# Patient Record
Sex: MALE | Race: WHITE | NOT HISPANIC OR LATINO | Employment: OTHER | URBAN - NONMETROPOLITAN AREA
[De-identification: names, ages, dates, MRNs, and addresses within clinical notes are randomized per-mention and may not be internally consistent; named-entity substitution may affect disease eponyms.]

---

## 2017-01-05 ENCOUNTER — OFFICE VISIT (OUTPATIENT)
Dept: MEDICAL GROUP | Facility: PHYSICIAN GROUP | Age: 62
End: 2017-01-05
Payer: COMMERCIAL

## 2017-01-05 VITALS
RESPIRATION RATE: 18 BRPM | WEIGHT: 250 LBS | SYSTOLIC BLOOD PRESSURE: 130 MMHG | DIASTOLIC BLOOD PRESSURE: 70 MMHG | TEMPERATURE: 98.5 F | HEIGHT: 71 IN | BODY MASS INDEX: 35 KG/M2 | HEART RATE: 98 BPM | OXYGEN SATURATION: 93 %

## 2017-01-05 DIAGNOSIS — Z12.5 PROSTATE CANCER SCREENING: ICD-10-CM

## 2017-01-05 DIAGNOSIS — M51.26 RUPTURED LUMBAR DISC: ICD-10-CM

## 2017-01-05 DIAGNOSIS — E66.09 OBESITY DUE TO EXCESS CALORIES, UNSPECIFIED OBESITY SEVERITY: ICD-10-CM

## 2017-01-05 DIAGNOSIS — M10.9 GOUT, UNSPECIFIED CAUSE, UNSPECIFIED CHRONICITY, UNSPECIFIED SITE: ICD-10-CM

## 2017-01-05 DIAGNOSIS — F32.A DEPRESSION, UNSPECIFIED DEPRESSION TYPE: ICD-10-CM

## 2017-01-05 DIAGNOSIS — E55.9 VITAMIN D DEFICIENCY: ICD-10-CM

## 2017-01-05 DIAGNOSIS — I10 ESSENTIAL HYPERTENSION: ICD-10-CM

## 2017-01-05 PROCEDURE — 99214 OFFICE O/P EST MOD 30 MIN: CPT | Performed by: INTERNAL MEDICINE

## 2017-01-05 RX ORDER — CYCLOBENZAPRINE HCL 10 MG
10 TABLET ORAL NIGHTLY PRN
Qty: 30 TAB | Refills: 3 | Status: SHIPPED | OUTPATIENT
Start: 2017-01-05 | End: 2017-12-28 | Stop reason: SDUPTHER

## 2017-01-05 ASSESSMENT — PATIENT HEALTH QUESTIONNAIRE - PHQ9: CLINICAL INTERPRETATION OF PHQ2 SCORE: 0

## 2017-01-05 NOTE — ASSESSMENT & PLAN NOTE
Variable, not using the flexeril currently , on the diclofenac with fair results.  Has not had injections for over 3 years, history of facet injections in the past as well.

## 2017-01-05 NOTE — MR AVS SNAPSHOT
"        Hayden Cooper   2017 9:00 AM   Office Visit   MRN: [id number]    Department:  [facility]   Dept Phone:  [phone]    Description:  Male : 1955   Provider:  Laura DOMINIQUE M.D.           Reason for Visit     Medication Refill Lisinopril       Allergies as of 2017     No Known Allergies      You were diagnosed with     Essential hypertension   [1739317]       Ruptured lumbar disc   [917998]       Obesity due to excess calories, unspecified obesity severity   [6880696]       Depression, unspecified depression type   [9753844]       Vitamin D deficiency   [7173241]       Prostate cancer screening   [082480]       Gout, unspecified cause, unspecified chronicity, unspecified site   [7551167]         Vital Signs     Blood Pressure Pulse Temperature Respirations Height Weight    130/70 mmHg 98 36.9 °C (98.5 °F) 18 1.816 m (5' 11.5\") 113.399 kg (250 lb)    Body Mass Index Oxygen Saturation Smoking Status             34.39 kg/m2 93% Former Smoker         Basic Information     Date Of Birth Sex Race Ethnicity Preferred Language    1955 Male White Non- English      Your appointments     Mar 14, 2017  8:15 AM   Adult Draw/Collection with [facility]   [facility] (--)    [address]  [place] [address]   [phone]            Mar 21, 2017  9:20 AM   Established Patient with Laura DOMINIQUE M.D.   [facility] (--)    [address]  [place] [id number] [phone]           You will be receiving a confirmation call a few days before your appointment from our automated call confirmation system.              Problem List              ICD-10-CM Priority Class Noted - Resolved    Ruptured lumbar disc M51.26   2011 - Present    H/O: gout Z87.39   2011 - Present    Depression F32.9   2011 - Present    HTN (hypertension) I10   2012 - Present    Mild vitamin D deficiency E55.9   2012 - Present    Obesity E66.9   " 4/19/2012 - Present    Dyslipidemia E78.5   6/14/2012 - Present    Hyperkeratosis L85.9   10/1/2015 - Present    History of colonic polyps Z86.010   2/3/2016 - Present    Primary insomnia F51.01   9/21/2016 - Present    Peyronie disease N48.6   9/21/2016 - Present      Health Maintenance        Date Due Completion Dates    IMM ZOSTER VACCINE 10/20/2013 ---    IMM INFLUENZA (1) 9/1/2016 ---    COLONOSCOPY 4/14/2022 4/14/2012 (N/S)    Override on 4/14/2012: (N/S)    IMM DTaP/Tdap/Td Vaccine (2 - Td) 6/14/2022 6/14/2012            Current Immunizations     Tdap Vaccine 6/14/2012      Below and/or attached are the medications your provider expects you to take. Review all of your home medications and newly ordered medications with your provider and/or pharmacist. Follow medication instructions as directed by your provider and/or pharmacist. Please keep your medication list with you and share with your provider. Update the information when medications are discontinued, doses are changed, or new medications (including over-the-counter products) are added; and carry medication information at all times in the event of emergency situations     Allergies:  No Known Allergies          Medications  Valid as of: January 05, 2017 -  9:56 AM    Generic Name Brand Name Tablet Size Instructions for use    Allopurinol (Tab) ZYLOPRIM 300 MG Take 1 Tab by mouth every day.        Cholecalciferol (Tab) Vitamin D (Cholecalciferol) 1000 UNITS Take  by mouth.        Cyclobenzaprine HCl (Tab) FLEXERIL 10 MG Take 1 Tab by mouth at bedtime as needed.        Diclofenac Sodium (Tablet Delayed Response) VOLTAREN 75 MG Take 1 Tab by mouth 2 times a day.        Gabapentin (Cap) NEURONTIN 300 MG Take 1 Cap by mouth 3 times a day.        Lisinopril (Tab) PRINIVIL 20 MG Take 1 Tab by mouth 2 times a day.        Lovastatin (Tab) MEVACOR 40 MG Take 1 Tab by mouth every day.        Niacin (Antihyperlipidemic) (Tab CR) NIASPAN 500 MG Take 1 Tab by mouth  every day.        Zolpidem Tartrate (Tab) AMBIEN 10 MG Take 1 Tab by mouth at bedtime as needed for Sleep.        .                 Medicines prescribed today were sent to:     [address] - [address] [place] [address] [place] [address]    Phone: [phone] Fax: [phone]    Open 24 Hours?: No      Medication refill instructions:       If your prescription bottle indicates you have medication refills left, it is not necessary to call your provider’s office. Please contact your pharmacy and they will refill your medication.    If your prescription bottle indicates you do not have any refills left, you may request refills at any time through one of the following ways: The online [employer] system (except Urgent Care), by calling your provider’s office, or by asking your pharmacy to contact your provider’s office with a refill request. Medication refills are processed only during regular business hours and may not be available until the next business day. Your provider may request additional information or to have a follow-up visit with you prior to refilling your medication.   *Please Note: Medication refills are assigned a new Rx number when refilled electronically. Your pharmacy may indicate that no refills were authorized even though a new prescription for the same medication is available at the pharmacy. Please request the medicine by name with the pharmacy before contacting your provider for a refill.        Your To Do List     Future Labs/Procedures Complete By Expires    CBC WITH DIFFERENTIAL  As directed 1/5/2018    COMP METABOLIC PANEL  As directed 1/5/2018    HEMOGLOBIN A1C  As directed 1/5/2018    LIPID PROFILE  As directed 1/5/2018    PROSTATE SPECIFIC AG SCREENING  As directed 1/5/2018    VITAMIN D,25 HYDROXY  As directed 1/5/2018      Instructions    Exercise  Cut portions in 1/2, weight loss.               [facility] Status: Patient Declined

## 2017-01-05 NOTE — ASSESSMENT & PLAN NOTE
mac states depression is ok, he is not working, sleeping pretty well, not using the ambien at all, limited walking.  He enjoys hunting but otherwise no specific activity.

## 2017-01-10 NOTE — PROGRESS NOTES
Chief Complaint   Patient presents with   • Medication Refill     Lisinopril        HISTORY OF PRESENT ILLNESS: Patient is a 61 y.o. male established patient who presents today to discuss the medical issues below.    HTN (hypertension)  Following at home generally in the 115-125/70 range.  Weight is up a bit.  He has noted heart rate is at 98, no chest pain or palpitations.      Ruptured lumbar disc  Variable, not using the flexeril currently , on the diclofenac with fair results.  Has not had injections for over 3 years, history of facet injections in the past as well. No Le numbness tingling or weakness    Obesity  Patient has gained a bit of weight. Admits to trouble with diet over the holidays, not exercising regullarly    Depression  patietn states depression is ok, he is not working, sleeping pretty well, not using the ambien at all, limited walking.  He enjoys hunting but otherwise no specific activity.        Patient Active Problem List    Diagnosis Date Noted   • Primary insomnia 09/21/2016   • Peyronie disease 09/21/2016   • History of colonic polyps 02/03/2016   • Hyperkeratosis 10/01/2015   • Dyslipidemia 06/14/2012   • Mild vitamin D deficiency 04/19/2012   • Obesity 04/19/2012   • HTN (hypertension) 04/11/2012   • Ruptured lumbar disc 12/01/2011   • H/O: gout 12/01/2011   • Depression 12/01/2011       Allergies:Review of patient's allergies indicates no known allergies.    Current Outpatient Prescriptions   Medication Sig Dispense Refill   • cyclobenzaprine (FLEXERIL) 10 MG Tab Take 1 Tab by mouth at bedtime as needed. 30 Tab 3   • Vitamin D, Cholecalciferol, 1000 UNITS Tab Take  by mouth.     • lisinopril (PRINIVIL) 20 MG Tab Take 1 Tab by mouth 2 times a day. 180 Tab 3   • lovastatin (MEVACOR) 40 MG tablet Take 1 Tab by mouth every day. 90 Tab 3   • allopurinol (ZYLOPRIM) 300 MG Tab Take 1 Tab by mouth every day. 90 Tab 3   • niacin SR (NIASPAN) 500 MG Tab CR Take 1 Tab by mouth every day. 90 Tab 1  "  • gabapentin (NEURONTIN) 300 MG Cap Take 1 Cap by mouth 3 times a day. 180 Cap 3   • diclofenac EC (VOLTAREN) 75 MG Tablet Delayed Response Take 1 Tab by mouth 2 times a day. 60 Tab 3   • zolpidem (AMBIEN) 10 MG Tab Take 1 Tab by mouth at bedtime as needed for Sleep. (Patient not taking: Reported on 2017) 30 Tab 2     No current facility-administered medications for this visit.         Past Medical History   Diagnosis Date   • Ruptured lumbar disc 2011   • GOUT 2011   • Depression 2011   • Hyperkeratosis 10/1/2015   • Primary insomnia 2016   • Peyronie disease 2016       Social History   Substance Use Topics   • Smoking status: Former Smoker -- 10 years     Types: Cigarettes     Quit date: 04/10/1985   • Smokeless tobacco: Former User     Types: Chew     Quit date: 03/15/2014      Comment: x 30 years - 7.12.12 thinking about it   • Alcohol Use: 0.0 oz/week     0 Standard drinks or equivalent per week      Comment: maybe 6 beers a month       Family Status   Relation Status Death Age   • Mother  72     CHF   • Sister Alive    • Father  86     MI   • Brother Alive    • Maternal Grandmother     • Maternal Grandfather     • Paternal Grandmother     • Paternal Grandfather     • Daughter Alive      Family History   Problem Relation Age of Onset   • Heart Disease Mother    • Diabetes Mother    • Diabetes Sister        ROS:    Respiratory: Negative for cough, sputum production, shortness of breath or wheezing.    Cardiovascular: Negative for chest pain, palpitations, orthopnea, dyspnea with exertion or edema.   Gastrointestinal: Negative for GI upset, nausea, vomiting, abdominal pain, constipation or diarrhea.   Genitourinary: Negative for dysuria, urgency, hesitancy or frequency.       Exam:    Blood pressure 130/70, pulse 98, temperature 36.9 °C (98.5 °F), resp. rate 18, height 1.816 m (5' 11.5\"), weight 113.399 kg (250 lb), SpO2 93 " %.  General:  Well nourished, well developed male in NAD.  HENT: Normocephalic, bilateral TMs are intact, nasal and oral mucosa with no lesions,   Spine: diffuse lumbar tenderness, no localization to vertebral body, neg straight leg  Pulmonary: Clear to ausculation and percussion.  Normal effort. No rales, rhonchi, or wheezing.  Cardiovascular: Regular rate and rhythm without murmur.   Abdomen: Normal bowel sounds soft and nontender no palpable liver spleen bladder mass.  Extremities: No LE edema noted.  Neuro: Grossly nonfocal.  Psych: Alert and oriented to person, place, and time. Appropriate mood and conversation.        This dictation was created using voice recognition software. I have made reasonable attempts to correct errors, however, errors of grammar and content may exist.          Assessment/Plan:    1. Essential hypertension  Well controlled, discussed lab monitoring   - COMP METABOLIC PANEL; Future  - CBC WITH DIFFERENTIAL; Future    2. Ruptured lumbar disc  Stable with good back mechanics, prn flexeril, diclofenac, discussed good back mechanics.   - cyclobenzaprine (FLEXERIL) 10 MG Tab; Take 1 Tab by mouth at bedtime as needed.  Dispense: 30 Tab; Refill: 3    3. Obesity due to excess calories, unspecified obesity severity  Discussed implication of the weight on back pain, reviewed diet exercise and seight loss.   - LIPID PROFILE; Future  - HEMOGLOBIN A1C; Future    4. Depression, unspecified depression type  Patient states he is doing fairly well, discussed regular exercise as helpful support.     5. Vitamin D deficiency  On supplement, continue monitoring  - VITAMIN D,25 HYDROXY; Future    6. Prostate cancer screening  - PROSTATE SPECIFIC AG SCREENING; Future    7. Gout, unspecified cause, unspecified chronicity, unspecified site  No recent outbreaks, on allopurinol, at risk if weight loss discussed, monitor labs.   - URIC ACID, SERUM

## 2017-03-14 ENCOUNTER — HOSPITAL ENCOUNTER (OUTPATIENT)
Dept: LAB | Facility: MEDICAL CENTER | Age: 62
End: 2017-03-14
Attending: INTERNAL MEDICINE
Payer: COMMERCIAL

## 2017-03-14 DIAGNOSIS — Z12.5 PROSTATE CANCER SCREENING: ICD-10-CM

## 2017-03-14 DIAGNOSIS — E66.09 OBESITY DUE TO EXCESS CALORIES, UNSPECIFIED OBESITY SEVERITY: ICD-10-CM

## 2017-03-14 DIAGNOSIS — E55.9 VITAMIN D DEFICIENCY: ICD-10-CM

## 2017-03-14 DIAGNOSIS — I10 ESSENTIAL HYPERTENSION: ICD-10-CM

## 2017-03-14 LAB
25(OH)D3 SERPL-MCNC: 31 NG/ML (ref 30–100)
ALBUMIN SERPL BCP-MCNC: 4.2 G/DL (ref 3.2–4.9)
ALBUMIN/GLOB SERPL: 1.6 G/DL
ALP SERPL-CCNC: 65 U/L (ref 30–99)
ALT SERPL-CCNC: 29 U/L (ref 2–50)
ANION GAP SERPL CALC-SCNC: 6 MMOL/L (ref 0–11.9)
AST SERPL-CCNC: 23 U/L (ref 12–45)
BASOPHILS # BLD AUTO: 0.04 K/UL (ref 0–0.12)
BASOPHILS NFR BLD AUTO: 0.6 % (ref 0–1.8)
BILIRUB SERPL-MCNC: 0.7 MG/DL (ref 0.1–1.5)
BUN SERPL-MCNC: 18 MG/DL (ref 8–22)
CALCIUM SERPL-MCNC: 9.4 MG/DL (ref 8.5–10.5)
CHLORIDE SERPL-SCNC: 104 MMOL/L (ref 96–112)
CHOLEST SERPL-MCNC: 147 MG/DL (ref 100–199)
CO2 SERPL-SCNC: 28 MMOL/L (ref 20–33)
CREAT SERPL-MCNC: 1.14 MG/DL (ref 0.5–1.4)
EOSINOPHIL # BLD: 0.08 K/UL (ref 0–0.51)
EOSINOPHIL NFR BLD AUTO: 1.2 % (ref 0–6.9)
ERYTHROCYTE [DISTWIDTH] IN BLOOD BY AUTOMATED COUNT: 43.6 FL (ref 35.9–50)
EST. AVERAGE GLUCOSE BLD GHB EST-MCNC: 111 MG/DL
GLOBULIN SER CALC-MCNC: 2.6 G/DL (ref 1.9–3.5)
GLUCOSE SERPL-MCNC: 108 MG/DL (ref 65–99)
HBA1C MFR BLD: 5.5 % (ref 0–5.6)
HCT VFR BLD AUTO: 49 % (ref 42–52)
HDLC SERPL-MCNC: 31 MG/DL
HGB BLD-MCNC: 16.9 G/DL (ref 14–18)
IMM GRANULOCYTES # BLD AUTO: 0.05 K/UL (ref 0–0.11)
IMM GRANULOCYTES NFR BLD AUTO: 0.8 % (ref 0–0.9)
LDLC SERPL CALC-MCNC: 61 MG/DL
LYMPHOCYTES # BLD: 2.23 K/UL (ref 1–4.8)
LYMPHOCYTES NFR BLD AUTO: 34.8 % (ref 22–41)
MCH RBC QN AUTO: 33.3 PG (ref 27–33)
MCHC RBC AUTO-ENTMCNC: 34.5 G/DL (ref 33.7–35.3)
MCV RBC AUTO: 96.5 FL (ref 81.4–97.8)
MONOCYTES # BLD: 0.32 K/UL (ref 0–0.85)
MONOCYTES NFR BLD AUTO: 5 % (ref 0–13.4)
NEUTROPHILS # BLD: 3.69 K/UL (ref 1.82–7.42)
NEUTROPHILS NFR BLD AUTO: 57.6 % (ref 44–72)
NRBC # BLD AUTO: 0 K/UL
NRBC BLD-RTO: 0 /100 WBC
PLATELET # BLD AUTO: 190 K/UL (ref 164–446)
PMV BLD AUTO: 10.4 FL (ref 9–12.9)
POTASSIUM SERPL-SCNC: 4.1 MMOL/L (ref 3.6–5.5)
PROT SERPL-MCNC: 6.8 G/DL (ref 6–8.2)
PSA SERPL DL<=0.01 NG/ML-MCNC: 1.35 NG/ML (ref 0–4)
RBC # BLD AUTO: 5.08 M/UL (ref 4.7–6.1)
SODIUM SERPL-SCNC: 138 MMOL/L (ref 135–145)
TRIGL SERPL-MCNC: 277 MG/DL (ref 0–149)
URATE SERPL-MCNC: 5.3 MG/DL (ref 2.5–8.3)
WBC # BLD AUTO: 6.4 K/UL (ref 4.8–10.8)

## 2017-03-14 PROCEDURE — 83036 HEMOGLOBIN GLYCOSYLATED A1C: CPT

## 2017-03-14 PROCEDURE — 84153 ASSAY OF PSA TOTAL: CPT

## 2017-03-14 PROCEDURE — 82306 VITAMIN D 25 HYDROXY: CPT

## 2017-03-14 PROCEDURE — 80053 COMPREHEN METABOLIC PANEL: CPT

## 2017-03-14 PROCEDURE — 84550 ASSAY OF BLOOD/URIC ACID: CPT

## 2017-03-14 PROCEDURE — 85025 COMPLETE CBC W/AUTO DIFF WBC: CPT

## 2017-03-14 PROCEDURE — 36415 COLL VENOUS BLD VENIPUNCTURE: CPT

## 2017-03-14 PROCEDURE — 80061 LIPID PANEL: CPT

## 2017-03-23 ENCOUNTER — OFFICE VISIT (OUTPATIENT)
Dept: MEDICAL GROUP | Facility: PHYSICIAN GROUP | Age: 62
End: 2017-03-23
Payer: COMMERCIAL

## 2017-03-23 VITALS
OXYGEN SATURATION: 93 % | SYSTOLIC BLOOD PRESSURE: 110 MMHG | WEIGHT: 243 LBS | HEART RATE: 105 BPM | RESPIRATION RATE: 16 BRPM | HEIGHT: 72 IN | TEMPERATURE: 98.6 F | BODY MASS INDEX: 32.91 KG/M2 | DIASTOLIC BLOOD PRESSURE: 70 MMHG

## 2017-03-23 DIAGNOSIS — Z87.39 H/O: GOUT: ICD-10-CM

## 2017-03-23 DIAGNOSIS — R73.01 FASTING HYPERGLYCEMIA: ICD-10-CM

## 2017-03-23 DIAGNOSIS — M51.26 RUPTURED LUMBAR DISC: ICD-10-CM

## 2017-03-23 DIAGNOSIS — F51.01 PRIMARY INSOMNIA: ICD-10-CM

## 2017-03-23 DIAGNOSIS — E66.09 OBESITY DUE TO EXCESS CALORIES, UNSPECIFIED OBESITY SEVERITY: ICD-10-CM

## 2017-03-23 DIAGNOSIS — E55.9 MILD VITAMIN D DEFICIENCY: ICD-10-CM

## 2017-03-23 DIAGNOSIS — E78.5 DYSLIPIDEMIA: ICD-10-CM

## 2017-03-23 PROCEDURE — 99214 OFFICE O/P EST MOD 30 MIN: CPT | Performed by: INTERNAL MEDICINE

## 2017-03-23 ASSESSMENT — PAIN SCALES - GENERAL: PAINLEVEL: NO PAIN

## 2017-03-23 NOTE — ASSESSMENT & PLAN NOTE
Patient with no prior history of glucose abnormalities, has worked on the bit of weight. Had lab work done fasting.

## 2017-03-23 NOTE — MR AVS SNAPSHOT
"Hayden Cooper   3/23/2017 11:20 AM   Office Visit   MRN: [id number]    Department:  [facility]   Dept Phone:  [phone]    Description:  Male : 1955   Provider:  Laura DOMINIQUE M.D.           Reason for Visit     Results labs     Follow-Up medications       Allergies as of 3/23/2017     No Known Allergies      You were diagnosed with     Ruptured lumbar disc   [801710]       Fasting hyperglycemia   [051001]       Obesity due to excess calories, unspecified obesity severity   [6246276]       Primary insomnia   [554570]       Mild vitamin D deficiency   [800689]       Dyslipidemia   [335561]       H/O: gout   [977508]         Vital Signs     Blood Pressure Pulse Temperature Respirations Height Weight    110/70 mmHg 105 37 °C (98.6 °F) 16 1.816 m (5' 11.5\") 110.224 kg (243 lb)    Body Mass Index Oxygen Saturation Smoking Status             33.42 kg/m2 93% Former Smoker         Basic Information     Date Of Birth Sex Race Ethnicity Preferred Language    1955 Male White Non- English      Your appointments     Sep 11, 2017  9:00 AM   Adult Draw/Collection with [facility]   [facility]    [address] [facility] [address]   [phone]            Sep 15, 2017  9:00 AM   Established Patient with Laura DOMINIQUE M.D.   [facility] (--)    [address] [place] [address]   [phone]           You will be receiving a confirmation call a few days before your appointment from our automated call confirmation system.              Problem List              ICD-10-CM Priority Class Noted - Resolved    Ruptured lumbar disc M51.26   2011 - Present    H/O: gout Z87.39   2011 - Present    Depression F32.9   2011 - Present    HTN (hypertension) I10   2012 - Present    Mild vitamin D deficiency E55.9   2012 - Present    Obesity E66.9   2012 - Present    Dyslipidemia E78.5   2012 - Present    Hyperkeratosis " L85.9   10/1/2015 - Present    History of colonic polyps Z86.010   2/3/2016 - Present    Primary insomnia F51.01   9/21/2016 - Present    Peyronie disease N48.6   9/21/2016 - Present    Fasting hyperglycemia R73.01   3/23/2017 - Present      Health Maintenance        Date Due Completion Dates    IMM ZOSTER VACCINE 10/20/2013 ---    IMM INFLUENZA (1) 9/1/2016 ---    COLONOSCOPY 4/14/2022 4/14/2012 (N/S)    Override on 4/14/2012: (N/S)    IMM DTaP/Tdap/Td Vaccine (2 - Td) 6/14/2022 6/14/2012            Current Immunizations     Tdap Vaccine 6/14/2012      Below and/or attached are the medications your provider expects you to take. Review all of your home medications and newly ordered medications with your provider and/or pharmacist. Follow medication instructions as directed by your provider and/or pharmacist. Please keep your medication list with you and share with your provider. Update the information when medications are discontinued, doses are changed, or new medications (including over-the-counter products) are added; and carry medication information at all times in the event of emergency situations     Allergies:  No Known Allergies          Medications  Valid as of: March 23, 2017 - 11:57 AM    Generic Name Brand Name Tablet Size Instructions for use    Allopurinol (Tab) ZYLOPRIM 300 MG Take 1 Tab by mouth every day.        Cholecalciferol (Tab) Vitamin D (Cholecalciferol) 1000 UNITS Take  by mouth.        Cyclobenzaprine HCl (Tab) FLEXERIL 10 MG Take 1 Tab by mouth at bedtime as needed.        Lisinopril (Tab) PRINIVIL 20 MG Take 1 Tab by mouth 2 times a day.        Lovastatin (Tab) MEVACOR 40 MG Take 1 Tab by mouth every day.        Niacin (Antihyperlipidemic) (Tab CR) NIASPAN 500 MG Take 1 Tab by mouth every day.        .                 Medicines prescribed today were sent to:     [facility] [address] [place] [address]    Phone: [phone] Fax:  [phone]    Open 24 Hours?: No      Medication refill instructions:       If your prescription bottle indicates you have medication refills left, it is not necessary to call your provider’s office. Please contact your pharmacy and they will refill your medication.    If your prescription bottle indicates you do not have any refills left, you may request refills at any time through one of the following ways: The online [employer] system (except Urgent Care), by calling your provider’s office, or by asking your pharmacy to contact your provider’s office with a refill request. Medication refills are processed only during regular business hours and may not be available until the next business day. Your provider may request additional information or to have a follow-up visit with you prior to refilling your medication.   *Please Note: Medication refills are assigned a new Rx number when refilled electronically. Your pharmacy may indicate that no refills were authorized even though a new prescription for the same medication is available at the pharmacy. Please request the medicine by name with the pharmacy before contacting your provider for a refill.        Your To Do List     Future Labs/Procedures Complete By Expires    BASIC METABOLIC PANEL  As directed 3/23/2018      Instructions      Continue diet exercise weight loss  Follow-up office visit 3-6 months  Lab work after a meal, prior to office visit; screening for prediabetes    Continue medications.          [employer] Status: Patient Declined

## 2017-03-23 NOTE — PROGRESS NOTES
Chief Complaint   Patient presents with   • Results     labs    • Follow-Up     medications        HISTORY OF PRESENT ILLNESS: Patient is a 61 y.o. male established patient who presents today to discuss the medical issues below.    Ruptured lumbar disc  Patient rarely using the cyclobenzaprine. Currently taking some over-the-counter Motrin. Not utilizing any of the Voltaren. No specific complaints of pain offered today, no lower extreme numbness tingling weakness. No recent injections.    Obesity  Patient states he's cut back on some portion C starting to walk with a new puppy dog.    Primary insomnia  Patient states she's not utilizing his Ambien    Mild vitamin D deficiency  Patient states he is taking one or 2 of the 2000 international unit tablets a day had lab work done.    Dyslipidemia  Patient continues on his lovastatin and niacin over-the-counter. Had lab work done. Some weight loss.    H/O: gout  Patient denies any gouty attacks continues on Zyloprim. Tries to follow a low uric acid diet.    Fasting hyperglycemia  Patient with no prior history of glucose abnormalities, has worked on the bit of weight. Had lab work done fasting.      Patient Active Problem List    Diagnosis Date Noted   • Fasting hyperglycemia 03/23/2017   • Primary insomnia 09/21/2016   • Peyronie disease 09/21/2016   • History of colonic polyps 02/03/2016   • Hyperkeratosis 10/01/2015   • Dyslipidemia 06/14/2012   • Mild vitamin D deficiency 04/19/2012   • Obesity 04/19/2012   • HTN (hypertension) 04/11/2012   • Ruptured lumbar disc 12/01/2011   • H/O: gout 12/01/2011   • Depression 12/01/2011       Allergies:Review of patient's allergies indicates no known allergies.    Current Outpatient Prescriptions   Medication Sig Dispense Refill   • Vitamin D, Cholecalciferol, 1000 UNITS Tab Take  by mouth.     • lisinopril (PRINIVIL) 20 MG Tab Take 1 Tab by mouth 2 times a day. 180 Tab 3   • lovastatin (MEVACOR) 40 MG tablet Take 1 Tab by mouth  "every day. 90 Tab 3   • allopurinol (ZYLOPRIM) 300 MG Tab Take 1 Tab by mouth every day. 90 Tab 3   • niacin SR (NIASPAN) 500 MG Tab CR Take 1 Tab by mouth every day. 90 Tab 1   • cyclobenzaprine (FLEXERIL) 10 MG Tab Take 1 Tab by mouth at bedtime as needed. (Patient not taking: Reported on 3/23/2017) 30 Tab 3     No current facility-administered medications for this visit.         Past Medical History   Diagnosis Date   • Ruptured lumbar disc 2011   • GOUT 2011   • Depression 2011   • Hyperkeratosis 10/1/2015   • Primary insomnia 2016   • Peyronie disease 2016   • Fasting hyperglycemia 3/23/2017       Social History   Substance Use Topics   • Smoking status: Former Smoker -- 10 years     Types: Cigarettes     Quit date: 04/10/1985   • Smokeless tobacco: Former User     Types: Chew     Quit date: 03/15/2014      Comment: x 30 years - 7.12.12 thinking about it   • Alcohol Use: 0.0 oz/week     0 Standard drinks or equivalent per week      Comment: maybe 6 beers a month       Family Status   Relation Status Death Age   • Mother  72     CHF   • Sister Alive    • Father  86     MI   • Brother Alive    • Maternal Grandmother     • Maternal Grandfather     • Paternal Grandmother     • Paternal Grandfather     • Daughter Alive      Family History   Problem Relation Age of Onset   • Heart Disease Mother    • Diabetes Mother    • Diabetes Sister        ROS:    Respiratory: Negative for cough, sputum production, shortness of breath or wheezing.    Cardiovascular: Negative for chest pain, palpitations, orthopnea, dyspnea with exertion or edema.   Gastrointestinal: Negative for GI upset, nausea, vomiting, abdominal pain, constipation or diarrhea.   Genitourinary: Negative for dysuria, urgency, hesitancy or frequency.       Exam:    Blood pressure 110/70, pulse 105, temperature 37 °C (98.6 °F), resp. rate 16, height 1.816 m (5' 11.5\"), weight 110.224 kg (243 " lb), SpO2 93 %.  General:  Well nourished, well developed male in NAD.  HENT: Normocephalic, bilateral TMs are intact, nasal and oral mucosa with no lesions,   Neck: Supple without bruit. Thyroid is not enlarged.  Pulmonary: Clear to ausculation and percussion.  Normal effort. No rales, rhonchi, or wheezing.  Cardiovascular: Regular rate and rhythm without murmur.   Abdomen: Normal bowel sounds soft and nontender no palpable liver spleen bladder mass.  Extremities: No LE edema noted.  Neuro: Grossly nonfocal.  Psych: Alert and oriented to person, place, and time. Appropriate mood and conversation.    LABS:  Results reviewed and discussed with the patient, questions answered.      This dictation was created using voice recognition software. I have made reasonable attempts to correct errors, however, errors of grammar and content may exist.          Assessment/Plan:    1. Ruptured lumbar disc  At baseline minimal over-the-counter medications    2. Fasting hyperglycemia  Reviewed labs and implications of the glucose fasting 108 ongoing diet exercise weight last check post prandial glucose at follow-up  - BASIC METABOLIC PANEL; Future    3. Obesity due to excess calories, unspecified obesity severity  Support regarding weight loss diet exercise    4. Primary insomnia  Currently acceptable level per patient support    5. Mild vitamin D deficiency  Borderline vitamin D increase to 4000 international units a day    6. Dyslipidemia  While controlled on meds normal liver function continuing present medication discussed at exercise weight loss    7. H/O: gout  Clinically stable uric acid level excellent continuing on preventative as allopurinol. Reviewed diet.    Patient was seen for 25 minutes face to face of which more than 50% of the time was spent in counseling and coordination of care regarding the above problems.

## 2017-03-23 NOTE — ASSESSMENT & PLAN NOTE
Patient states he is taking one or 2 of the 2000 international unit tablets a day had lab work done.

## 2017-03-23 NOTE — ASSESSMENT & PLAN NOTE
Patient continues on his lovastatin and niacin over-the-counter. Had lab work done. Some weight loss.

## 2017-03-23 NOTE — PATIENT INSTRUCTIONS
Continue diet exercise weight loss  Follow-up office visit 3-6 months  Lab work after a meal, prior to office visit; screening for prediabetes    Continue medications.

## 2017-09-13 ENCOUNTER — HOSPITAL ENCOUNTER (OUTPATIENT)
Dept: LAB | Facility: MEDICAL CENTER | Age: 62
End: 2017-09-13
Attending: INTERNAL MEDICINE
Payer: COMMERCIAL

## 2017-09-13 DIAGNOSIS — R73.01 FASTING HYPERGLYCEMIA: ICD-10-CM

## 2017-09-13 LAB
ANION GAP SERPL CALC-SCNC: 8 MMOL/L (ref 0–11.9)
BUN SERPL-MCNC: 17 MG/DL (ref 8–22)
CALCIUM SERPL-MCNC: 9.6 MG/DL (ref 8.5–10.5)
CHLORIDE SERPL-SCNC: 104 MMOL/L (ref 96–112)
CO2 SERPL-SCNC: 25 MMOL/L (ref 20–33)
CREAT SERPL-MCNC: 1.04 MG/DL (ref 0.5–1.4)
GFR SERPL CREATININE-BSD FRML MDRD: >60 ML/MIN/1.73 M 2
GLUCOSE SERPL-MCNC: 109 MG/DL (ref 65–99)
POTASSIUM SERPL-SCNC: 4.4 MMOL/L (ref 3.6–5.5)
SODIUM SERPL-SCNC: 137 MMOL/L (ref 135–145)

## 2017-09-13 PROCEDURE — 80048 BASIC METABOLIC PNL TOTAL CA: CPT

## 2017-09-13 PROCEDURE — 36415 COLL VENOUS BLD VENIPUNCTURE: CPT

## 2017-09-15 ENCOUNTER — OFFICE VISIT (OUTPATIENT)
Dept: MEDICAL GROUP | Facility: PHYSICIAN GROUP | Age: 62
End: 2017-09-15
Payer: COMMERCIAL

## 2017-09-15 VITALS
BODY MASS INDEX: 32.91 KG/M2 | RESPIRATION RATE: 16 BRPM | HEART RATE: 84 BPM | OXYGEN SATURATION: 95 % | DIASTOLIC BLOOD PRESSURE: 72 MMHG | HEIGHT: 72 IN | WEIGHT: 243 LBS | SYSTOLIC BLOOD PRESSURE: 100 MMHG | TEMPERATURE: 97.7 F

## 2017-09-15 DIAGNOSIS — M51.26 RUPTURED LUMBAR DISC: ICD-10-CM

## 2017-09-15 DIAGNOSIS — I10 ESSENTIAL HYPERTENSION: ICD-10-CM

## 2017-09-15 DIAGNOSIS — R05.9 COUGH: ICD-10-CM

## 2017-09-15 DIAGNOSIS — L85.9 HYPERKERATOSIS: ICD-10-CM

## 2017-09-15 DIAGNOSIS — R73.01 FASTING HYPERGLYCEMIA: ICD-10-CM

## 2017-09-15 DIAGNOSIS — E66.09 OBESITY DUE TO EXCESS CALORIES, UNSPECIFIED OBESITY SEVERITY: ICD-10-CM

## 2017-09-15 PROCEDURE — 99214 OFFICE O/P EST MOD 30 MIN: CPT | Performed by: INTERNAL MEDICINE

## 2017-09-15 RX ORDER — GABAPENTIN 100 MG/1
100 CAPSULE ORAL 3 TIMES DAILY
COMMUNITY
End: 2017-12-28

## 2017-09-15 RX ORDER — ZOLPIDEM TARTRATE 10 MG/1
10 TABLET ORAL NIGHTLY PRN
COMMUNITY
End: 2017-12-28

## 2017-09-15 ASSESSMENT — PAIN SCALES - GENERAL: PAINLEVEL: NO PAIN

## 2017-09-15 NOTE — ASSESSMENT & PLAN NOTE
Patient had exacerbation with pitch forking some hay.  He has not had injections x 4 years.  He is aware of back mechanics.  Chiropractic is helping, he did have left leg radiation which improved with the chiropractic.  Not taking anything but tylenol.

## 2017-09-15 NOTE — ASSESSMENT & PLAN NOTE
Patient reports he is working on weight loss, states was lower until injured back, had labs fasting.

## 2017-09-15 NOTE — ASSESSMENT & PLAN NOTE
Patient reports he has had a cough after an upper respiratory infection which lasted for several weeks. He states it has been resolved for 2 weeks.

## 2017-09-15 NOTE — PROGRESS NOTES
No chief complaint on file.      HISTORY OF PRESENT ILLNESS: Patient is a 61 y.o. male established patient who presents today to discuss the medical issues below.    Obesity  Patient reports he is working on weight loss, states was lower until injured back, had labs fasting.      Ruptured lumbar disc  Patient had exacerbation with pitch forking some hay.  He has not had injections x 4 years.  He is aware of back mechanics.  Chiropractic is helping, he did have left leg radiation which improved with the chiropractic.  Not taking anything but tylenol.    HTN (hypertension)  Not following at home, on meds as lisinopril.  No edema    Cough  Patient reports he has had a cough after an upper respiratory infection which lasted for several weeks. He states it has been resolved for 2 weeks.    Hyperkeratosis  Patient would like referral to dermatology. Grandfather  of melanoma in his 80s. He would like surveillance. He has not noted any suspicious lesions.      Patient Active Problem List    Diagnosis Date Noted   • Fasting hyperglycemia 2017     Priority: Medium   • Mild vitamin D deficiency 2012     Priority: Medium   • Obesity 2012     Priority: Medium   • HTN (hypertension) 2012     Priority: Medium   • Cough 09/15/2017   • Primary insomnia 2016   • Peyronie disease 2016   • History of colonic polyps 2016   • Hyperkeratosis 10/01/2015   • Dyslipidemia 2012   • Ruptured lumbar disc 2011   • H/O: gout 2011   • Depression 2011       Allergies:Review of patient's allergies indicates no known allergies.    Current Outpatient Prescriptions   Medication Sig Dispense Refill   • gabapentin (NEURONTIN) 100 MG Cap Take 100 mg by mouth 3 times a day.     • zolpidem (AMBIEN) 10 MG Tab Take 10 mg by mouth at bedtime as needed for Sleep.     • Vitamin D, Cholecalciferol, 1000 UNITS Tab Take  by mouth.     • lisinopril (PRINIVIL) 20 MG Tab Take 1 Tab by mouth 2  times a day. 180 Tab 3   • lovastatin (MEVACOR) 40 MG tablet Take 1 Tab by mouth every day. 90 Tab 3   • allopurinol (ZYLOPRIM) 300 MG Tab Take 1 Tab by mouth every day. 90 Tab 3   • niacin SR (NIASPAN) 500 MG Tab CR Take 1 Tab by mouth every day. 90 Tab 1   • cyclobenzaprine (FLEXERIL) 10 MG Tab Take 1 Tab by mouth at bedtime as needed. (Patient not taking: Reported on 3/23/2017) 30 Tab 3     No current facility-administered medications for this visit.          Past Medical History:   Diagnosis Date   • Fasting hyperglycemia 3/23/2017   • Primary insomnia 2016   • Peyronie disease 2016   • Hyperkeratosis 10/1/2015   • Ruptured lumbar disc 2011   • GOUT 2011   • Depression 2011       Social History   Substance Use Topics   • Smoking status: Former Smoker     Years: 10.00     Types: Cigarettes     Quit date: 4/10/1985   • Smokeless tobacco: Former User     Types: Chew     Quit date: 3/15/2014      Comment: x 30 years - 7.12.12 thinking about it   • Alcohol use 0.0 oz/week      Comment: maybe 6 beers a month       Family Status   Relation Status   • Mother  at age 72    CHF   • Sister Alive   • Father  at age 86    MI   • Brother Alive   • Maternal Grandmother    • Maternal Grandfather    • Paternal Grandmother    • Paternal Grandfather    • Daughter Alive     Family History   Problem Relation Age of Onset   • Heart Disease Mother    • Diabetes Mother    • Diabetes Sister        ROS:    Respiratory: Negative for sputum production, shortness of breath or wheezing.    Cardiovascular: Negative for chest pain, palpitations, orthopnea, dyspnea with exertion or edema.   Gastrointestinal: Negative for GI upset, nausea, vomiting, abdominal pain, constipation or diarrhea.   Genitourinary: Negative for dysuria, urgency, hesitancy or frequency.       Exam:    Blood pressure 100/72, pulse 84, temperature 36.5 °C (97.7 °F), resp. rate 16, height 1.816 m (5'  "11.5\"), weight 110.2 kg (243 lb), SpO2 95 %.  General:  Well nourished, well developed male in NAD.  HENT: Normocephalic, bilateral TMs are intact, nasal and oral mucosa with no lesions,   Neck: Supple without bruit. Thyroid is not enlarged.  Pulmonary: Clear to ausculation and percussion.  Normal effort. No rales, rhonchi, or wheezing.  Cardiovascular: Regular rate and rhythm without murmur.   Abdomen: Normal bowel sounds soft and nontender no palpable liver spleen bladder mass.  Extremities: No LE edema noted.  Neuro: Grossly nonfocal.  Psych: Alert and oriented to person, place, and time. Appropriate mood and conversation.    LABS: Results reviewed and discussed with the patient, questions answered.      This dictation was created using voice recognition software. I have made reasonable attempts to correct errors, however, errors of grammar and content may exist.          Assessment/Plan:    1. Obesity due to excess calories, unspecified obesity severity  Weight is pretty much status quo he has maintained a 7 pound weight loss from last year however. Reviewed diet exercise weight loss portion control.    2. Ruptured lumbar disc  Discussed good back mechanics he is currently content to follow with chiropractic. Discussed anti-inflammatories as Aleve instead of Tylenol for short-term course. If persisting recommend referral back to back specialist    3. Fasting hyperglycemia  Patient did have fasting labs done again as opposed to postprandial. Discussed insulin resistance, diet exercise weight loss, consideration for medications. Will set up for 3 month follow-up with postprandial glucose and check A1c. Implications of diet discussed again  - BASIC METABOLIC PANEL; Future  - HEMOGLOBIN A1C; Future    4. Essential hypertension  Continuing on meds ongoing monitoring    5. Cough  Resolved with currently no signs of allergy or lung disease. Monitor for any recurrence discussed with patient    6. Hyperkeratosis  On " cursory exam no significant skin lesions referral to dermatology at patient's request.  - REFERRAL TO DERMATOLOGY    Patient was seen for  25 minutes face to face of which more than 50% of the time was spent in counseling and coordination of care regarding the above problems.

## 2017-09-15 NOTE — ASSESSMENT & PLAN NOTE
Patient would like referral to dermatology. Grandfather  of melanoma in his 80s. He would like surveillance. He has not noted any suspicious lesions.

## 2017-10-16 DIAGNOSIS — Z87.39 H/O: GOUT: ICD-10-CM

## 2017-10-16 DIAGNOSIS — E78.5 DYSLIPIDEMIA: ICD-10-CM

## 2017-10-16 DIAGNOSIS — I10 ESSENTIAL HYPERTENSION: ICD-10-CM

## 2017-10-17 RX ORDER — LOVASTATIN 40 MG/1
TABLET ORAL
Qty: 90 TAB | Refills: 0 | Status: SHIPPED | OUTPATIENT
Start: 2017-10-17 | End: 2017-12-28 | Stop reason: SDUPTHER

## 2017-10-17 RX ORDER — ALLOPURINOL 300 MG/1
TABLET ORAL
Qty: 90 TAB | Refills: 0 | Status: SHIPPED | OUTPATIENT
Start: 2017-10-17 | End: 2017-12-28 | Stop reason: SDUPTHER

## 2017-10-17 RX ORDER — LISINOPRIL 20 MG/1
TABLET ORAL
Qty: 180 TAB | Refills: 0 | Status: SHIPPED | OUTPATIENT
Start: 2017-10-17 | End: 2017-12-28 | Stop reason: SDUPTHER

## 2017-10-17 NOTE — TELEPHONE ENCOUNTER
See MA's notes below, pt has met protocol, OV 9/17   BP Readings from Last 1 Encounters:   09/15/17 100/72     Lab Results   Component Value Date/Time    HDL 31 (A) 03/14/2017 08:12 AM

## 2017-12-20 ENCOUNTER — HOSPITAL ENCOUNTER (OUTPATIENT)
Dept: LAB | Facility: MEDICAL CENTER | Age: 62
End: 2017-12-20
Attending: INTERNAL MEDICINE
Payer: COMMERCIAL

## 2017-12-20 DIAGNOSIS — R73.01 FASTING HYPERGLYCEMIA: ICD-10-CM

## 2017-12-20 LAB
ANION GAP SERPL CALC-SCNC: 11 MMOL/L (ref 0–11.9)
BUN SERPL-MCNC: 15 MG/DL (ref 8–22)
CALCIUM SERPL-MCNC: 9.3 MG/DL (ref 8.5–10.5)
CHLORIDE SERPL-SCNC: 106 MMOL/L (ref 96–112)
CO2 SERPL-SCNC: 23 MMOL/L (ref 20–33)
CREAT SERPL-MCNC: 1 MG/DL (ref 0.5–1.4)
EST. AVERAGE GLUCOSE BLD GHB EST-MCNC: 117 MG/DL
GFR SERPL CREATININE-BSD FRML MDRD: >60 ML/MIN/1.73 M 2
GLUCOSE SERPL-MCNC: 108 MG/DL (ref 65–99)
HBA1C MFR BLD: 5.7 % (ref 0–5.6)
POTASSIUM SERPL-SCNC: 3.8 MMOL/L (ref 3.6–5.5)
SODIUM SERPL-SCNC: 140 MMOL/L (ref 135–145)

## 2017-12-20 PROCEDURE — 80048 BASIC METABOLIC PNL TOTAL CA: CPT

## 2017-12-20 PROCEDURE — 83036 HEMOGLOBIN GLYCOSYLATED A1C: CPT

## 2017-12-20 PROCEDURE — 36415 COLL VENOUS BLD VENIPUNCTURE: CPT

## 2017-12-28 ENCOUNTER — OFFICE VISIT (OUTPATIENT)
Dept: MEDICAL GROUP | Facility: PHYSICIAN GROUP | Age: 62
End: 2017-12-28
Payer: COMMERCIAL

## 2017-12-28 VITALS
DIASTOLIC BLOOD PRESSURE: 70 MMHG | SYSTOLIC BLOOD PRESSURE: 110 MMHG | HEIGHT: 72 IN | BODY MASS INDEX: 32.51 KG/M2 | TEMPERATURE: 98.3 F | WEIGHT: 240 LBS | OXYGEN SATURATION: 96 % | HEART RATE: 95 BPM | RESPIRATION RATE: 16 BRPM

## 2017-12-28 DIAGNOSIS — Z87.39 H/O: GOUT: ICD-10-CM

## 2017-12-28 DIAGNOSIS — E66.09 CLASS 1 OBESITY DUE TO EXCESS CALORIES WITHOUT SERIOUS COMORBIDITY WITH BODY MASS INDEX (BMI) OF 30.0 TO 30.9 IN ADULT: ICD-10-CM

## 2017-12-28 DIAGNOSIS — Z86.010 HISTORY OF COLONIC POLYPS: ICD-10-CM

## 2017-12-28 DIAGNOSIS — M51.26 RUPTURED LUMBAR DISC: ICD-10-CM

## 2017-12-28 DIAGNOSIS — R97.20 ELEVATED PSA: ICD-10-CM

## 2017-12-28 DIAGNOSIS — R73.01 FASTING HYPERGLYCEMIA: ICD-10-CM

## 2017-12-28 DIAGNOSIS — I10 ESSENTIAL HYPERTENSION: ICD-10-CM

## 2017-12-28 DIAGNOSIS — E78.5 DYSLIPIDEMIA: ICD-10-CM

## 2017-12-28 PROCEDURE — 99214 OFFICE O/P EST MOD 30 MIN: CPT | Performed by: INTERNAL MEDICINE

## 2017-12-28 RX ORDER — CYCLOBENZAPRINE HCL 10 MG
10 TABLET ORAL NIGHTLY PRN
Qty: 30 TAB | Refills: 0 | Status: SHIPPED | OUTPATIENT
Start: 2017-12-28 | End: 2019-01-10

## 2017-12-28 RX ORDER — LOVASTATIN 40 MG/1
40 TABLET ORAL DAILY
Qty: 90 TAB | Refills: 3 | Status: SHIPPED | OUTPATIENT
Start: 2017-12-28 | End: 2019-01-10 | Stop reason: SDUPTHER

## 2017-12-28 RX ORDER — ALLOPURINOL 300 MG/1
300 TABLET ORAL DAILY
Qty: 90 TAB | Refills: 3 | Status: SHIPPED | OUTPATIENT
Start: 2017-12-28 | End: 2019-01-10 | Stop reason: SDUPTHER

## 2017-12-28 RX ORDER — LISINOPRIL 20 MG/1
20 TABLET ORAL 2 TIMES DAILY
Qty: 180 TAB | Refills: 3 | Status: SHIPPED | OUTPATIENT
Start: 2017-12-28 | End: 2019-01-10 | Stop reason: SDUPTHER

## 2017-12-28 ASSESSMENT — PATIENT HEALTH QUESTIONNAIRE - PHQ9: CLINICAL INTERPRETATION OF PHQ2 SCORE: 0

## 2017-12-28 ASSESSMENT — PAIN SCALES - GENERAL: PAINLEVEL: 1=MINIMAL PAIN

## 2017-12-28 NOTE — ASSESSMENT & PLAN NOTE
Not generally following at home continues on lisinopril 20 mg twice a day, no chest pain palpitations edema

## 2017-12-28 NOTE — PROGRESS NOTES
Chief Complaint   Patient presents with   • Diabetes     lab results to check for pre-diabetes        HISTORY OF PRESENT ILLNESS: Patient is a 62 y.o. male established patient who presents today to discuss the medical issues below.    Ruptured lumbar disc  States low back limits exercise, no additional injections or treatments about 4 years ago.  Patient working on good back mechanics.  Reports if he is careful he doesn't do something odd he doesn't have pain.  Uses tylenol prn.      History of colonic polyps  Patient has heard from Dr Costa that he is due for colonoscopy surveillance.      Fasting hyperglycemia  Patient has been working on diet and metastatic some difficulty maintaining the weight loss. He does have a goal of losing 20 pounds.    HTN (hypertension)  Not generally following at home continues on lisinopril 20 mg twice a day, no chest pain palpitations edema    Obesity  BMI holding in the 33 range.    H/O: gout  He did attempt discontinuation of allopurinol had sensation of recurring gout restarted otherwise is doing well      Patient Active Problem List    Diagnosis Date Noted   • Fasting hyperglycemia 03/23/2017     Priority: Medium   • Mild vitamin D deficiency 04/19/2012     Priority: Medium   • Obesity 04/19/2012     Priority: Medium   • HTN (hypertension) 04/11/2012     Priority: Medium   • Cough 09/15/2017   • Primary insomnia 09/21/2016   • Peyronie disease 09/21/2016   • History of colonic polyps 02/03/2016   • Hyperkeratosis 10/01/2015   • Dyslipidemia 06/14/2012   • Ruptured lumbar disc 12/01/2011   • H/O: gout 12/01/2011   • Depression 12/01/2011       Allergies:Patient has no known allergies.    Current Outpatient Prescriptions   Medication Sig Dispense Refill   • cyclobenzaprine (FLEXERIL) 10 MG Tab Take 1 Tab by mouth at bedtime as needed. 30 Tab 0   • lisinopril (PRINIVIL) 20 MG Tab Take 1 Tab by mouth 2 times a day. 180 Tab 3   • allopurinol (ZYLOPRIM) 300 MG Tab Take 1 Tab by  "mouth every day. 90 Tab 3   • lovastatin (MEVACOR) 40 MG tablet Take 1 Tab by mouth every day. 90 Tab 3   • Vitamin D, Cholecalciferol, 1000 UNITS Tab Take  by mouth.     • niacin SR (NIASPAN) 500 MG Tab CR Take 1 Tab by mouth every day. 90 Tab 1     No current facility-administered medications for this visit.          Past Medical History:   Diagnosis Date   • Depression 2011   • Fasting hyperglycemia 3/23/2017   • GOUT 2011   • Hyperkeratosis 10/1/2015   • Peyronie disease 2016   • Primary insomnia 2016   • Ruptured lumbar disc 2011       Social History   Substance Use Topics   • Smoking status: Former Smoker     Years: 10.00     Types: Cigarettes     Quit date: 4/10/1985   • Smokeless tobacco: Former User     Types: Chew     Quit date: 3/15/2014      Comment: x 30 years - 7.12.12 thinking about it   • Alcohol use 0.0 oz/week      Comment: maybe 6 beers a month       Family Status   Relation Status   • Mother  at age 72    CHF   • Sister Alive   • Father  at age 86    MI   • Brother Alive   • Maternal Grandmother    • Maternal Grandfather    • Paternal Grandmother    • Paternal Grandfather    • Daughter Alive     Family History   Problem Relation Age of Onset   • Heart Disease Mother    • Diabetes Mother    • Diabetes Sister    • Diabetes Brother        ROS:    Respiratory: Negative for cough, sputum production, shortness of breath or wheezing.    Cardiovascular: Negative for chest pain, palpitations, orthopnea, dyspnea with exertion or edema.   Gastrointestinal: Negative for GI upset, nausea, vomiting, abdominal pain, constipation or diarrhea.   Genitourinary: Negative for dysuria, urgency, hesitancy or frequency.       Exam:    Blood pressure 110/70, pulse 95, temperature 36.8 °C (98.3 °F), resp. rate 16, height 1.816 m (5' 11.5\"), weight 108.9 kg (240 lb), SpO2 96 %.  General:  Well nourished, well developed male in NAD.  Neck: Supple " without bruit. Thyroid is not enlarged.  Pulmonary: Clear to ausculation and percussion.  Normal effort. No rales, rhonchi, or wheezing.  Cardiovascular: Regular rate and rhythm without murmur.   Abdomen: Normal bowel sounds soft and nontender no palpable liver spleen bladder mass.  Extremities: No LE edema noted.  Neuro: Grossly nonfocal.  Psych: Alert and oriented to person, place, and time. Appropriate mood and conversation.    LABS: Results reviewed and discussed with the patient, questions answered.      This dictation was created using voice recognition software. I have made reasonable attempts to correct errors, however, errors of grammar and content may exist.          Assessment/Plan:    1. Ruptured lumbar disc  Patient remains at baseline he is doing better with good back mechanics occasionally after a long day he will have aching and difficulty sleeping he is out of the Flexeril but did find out the most helpful. Otherwise is not taking nonsteroidals on a regular basis and is on no pain medications. Patient had a injections in the past that were not particularly helpful he is continuing with conservative management. Refill on Flexeril discussed good back mechanics when necessary anti-inflammatories.  - cyclobenzaprine (FLEXERIL) 10 MG Tab; Take 1 Tab by mouth at bedtime as needed.  Dispense: 30 Tab; Refill: 0    2. Essential hypertension  Well-controlled refills written monitor labs discuss weight loss  - lisinopril (PRINIVIL) 20 MG Tab; Take 1 Tab by mouth 2 times a day.  Dispense: 180 Tab; Refill: 3  - COMP METABOLIC PANEL; Future  - CBC WITH DIFFERENTIAL; Future    3. H/O: gout  Add uric acid 2 labs continuing on allopurinol  - allopurinol (ZYLOPRIM) 300 MG Tab; Take 1 Tab by mouth every day.  Dispense: 90 Tab; Refill: 3    4. Dyslipidemia  Continues on statin monitoring with labs refills written discussed diet and weight loss  - lovastatin (MEVACOR) 40 MG tablet; Take 1 Tab by mouth every day.   Dispense: 90 Tab; Refill: 3  - LIPID PROFILE; Future    5. Fasting hyperglycemia  A1c is remaining below diagnostic level of diabetes discussed implications brother has been diagnosed as having adult onset diabetes mellitus and is starting on medications.  - HEMOGLOBIN A1C; Future    6. Elevated PSA  Ongoing screening his PSA was elevated at last evaluation discussed implications  - PROSTATE SPECIFIC AG SCREENING; Future    7. History of colonic polyps  Patient indicates he is due for his colonoscopy and has been contacted by Dr. Costa    8. Class 1 obesity due to excess calories without serious comorbidity with body mass index (BMI) of 30.0 to 30.9 in adult  Discussed diet, exercise, weight loss, behavioral modification, portion size management.      Patient was seen for  25 minutes face to face of which more than 50% of the time was spent in counseling and coordination of care regarding the above problems.

## 2017-12-28 NOTE — ASSESSMENT & PLAN NOTE
Patient has been working on diet and metastatic some difficulty maintaining the weight loss. He does have a goal of losing 20 pounds.

## 2017-12-28 NOTE — ASSESSMENT & PLAN NOTE
States low back limits exercise, no additional injections or treatments about 4 years ago.  Patient working on good back mechanics.  Reports if he is careful he doesn't do something odd he doesn't have pain.  Uses tylenol prn.

## 2017-12-28 NOTE — ASSESSMENT & PLAN NOTE
He did attempt discontinuation of allopurinol had sensation of recurring gout restarted otherwise is doing well

## 2018-06-28 ENCOUNTER — OFFICE VISIT (OUTPATIENT)
Dept: MEDICAL GROUP | Facility: PHYSICIAN GROUP | Age: 63
End: 2018-06-28
Payer: COMMERCIAL

## 2018-06-28 VITALS
TEMPERATURE: 97.9 F | SYSTOLIC BLOOD PRESSURE: 120 MMHG | DIASTOLIC BLOOD PRESSURE: 70 MMHG | HEIGHT: 71 IN | HEART RATE: 90 BPM | BODY MASS INDEX: 35.42 KG/M2 | RESPIRATION RATE: 18 BRPM | WEIGHT: 253 LBS | OXYGEN SATURATION: 96 %

## 2018-06-28 DIAGNOSIS — I10 ESSENTIAL HYPERTENSION: ICD-10-CM

## 2018-06-28 DIAGNOSIS — L85.9 HYPERKERATOSIS: ICD-10-CM

## 2018-06-28 DIAGNOSIS — R73.01 FASTING HYPERGLYCEMIA: ICD-10-CM

## 2018-06-28 DIAGNOSIS — E66.09 CLASS 1 OBESITY DUE TO EXCESS CALORIES WITHOUT SERIOUS COMORBIDITY WITH BODY MASS INDEX (BMI) OF 30.0 TO 30.9 IN ADULT: ICD-10-CM

## 2018-06-28 DIAGNOSIS — M51.26 RUPTURED LUMBAR DISC: ICD-10-CM

## 2018-06-28 PROCEDURE — 99214 OFFICE O/P EST MOD 30 MIN: CPT | Performed by: INTERNAL MEDICINE

## 2018-06-28 RX ORDER — CELECOXIB 100 MG/1
100 CAPSULE ORAL 2 TIMES DAILY
Qty: 60 CAP | Refills: 3 | Status: SHIPPED | OUTPATIENT
Start: 2018-06-28 | End: 2019-01-10

## 2018-06-28 NOTE — ASSESSMENT & PLAN NOTE
Patient reports that his back is pretty much at baseline.  He has pain from the low spine occasional radiation into his legs but generally is doing fine states if he is at rest he has 0 out of 10 pain sometimes after a couple hours of work he will develop pain at about 4 out of 10.  He does utilize some over-the-counter ibuprofen with fair results.  He has had injections and physiotherapy in the past which is minimally helpful and is not interested currently in re-visiting that.  No lower extremity weakness.

## 2018-06-28 NOTE — PROGRESS NOTES
Chief Complaint   Patient presents with   • Back Pain     Fv back pain        HISTORY OF PRESENT ILLNESS: Patient is a 62 y.o. male established patient who presents today to discuss the medical issues below.    Ruptured lumbar disc  Patient reports that his back is pretty much at baseline.  He has pain from the low spine occasional radiation into his legs but generally is doing fine states if he is at rest he has 0 out of 10 pain sometimes after a couple hours of work he will develop pain at about 4 out of 10.  He does utilize some over-the-counter ibuprofen with fair results.  He has had injections and physiotherapy in the past which is minimally helpful and is not interested currently in re-visiting that.  No lower extremity weakness.    Hyperkeratosis  Patient has noted spot on his left ear that tends to get sick, flakes off and then reoccurs.    Obesity  Weight tends to wax and wane, he states he was as low as 230 pounds but is gradually regaining this.    HTN (hypertension)  Not following at home does continue on medications no chest pain palpitations edema no headaches or visual changes.    Fasting hyperglycemia  Patient has not had recent labs done will be due in November      Patient Active Problem List    Diagnosis Date Noted   • Fasting hyperglycemia 03/23/2017     Priority: Medium   • Mild vitamin D deficiency 04/19/2012     Priority: Medium   • Obesity 04/19/2012     Priority: Medium   • HTN (hypertension) 04/11/2012     Priority: Medium   • Cough 09/15/2017   • Primary insomnia 09/21/2016   • Peyronie disease 09/21/2016   • History of colonic polyps 02/03/2016   • Hyperkeratosis 10/01/2015   • Dyslipidemia 06/14/2012   • Ruptured lumbar disc 12/01/2011   • H/O: gout 12/01/2011   • Depression 12/01/2011       Allergies:Patient has no known allergies.    Current Outpatient Prescriptions   Medication Sig Dispense Refill   • celecoxib (CELEBREX) 100 MG Cap Take 1 Cap by mouth 2 times a day. 60 Cap 3   •  lisinopril (PRINIVIL) 20 MG Tab Take 1 Tab by mouth 2 times a day. 180 Tab 3   • allopurinol (ZYLOPRIM) 300 MG Tab Take 1 Tab by mouth every day. 90 Tab 3   • lovastatin (MEVACOR) 40 MG tablet Take 1 Tab by mouth every day. 90 Tab 3   • Vitamin D, Cholecalciferol, 1000 UNITS Tab Take  by mouth.     • niacin SR (NIASPAN) 500 MG Tab CR Take 1 Tab by mouth every day. 90 Tab 1   • cyclobenzaprine (FLEXERIL) 10 MG Tab Take 1 Tab by mouth at bedtime as needed. 30 Tab 0     No current facility-administered medications for this visit.          Past Medical History:   Diagnosis Date   • Depression 2011   • Fasting hyperglycemia 3/23/2017   • GOUT 2011   • Hyperkeratosis 10/1/2015   • Peyronie disease 2016   • Primary insomnia 2016   • Ruptured lumbar disc 2011       Social History   Substance Use Topics   • Smoking status: Former Smoker     Years: 10.00     Types: Cigarettes     Quit date: 4/10/1985   • Smokeless tobacco: Former User     Types: Chew     Quit date: 3/15/2014      Comment: x 30 years - 7.12.12 thinking about it   • Alcohol use 0.0 oz/week      Comment: rare       Family Status   Relation Status   • Mother  at age 72    CHF   • Sister Alive   • Father  at age 86    MI   • Brother Alive   • Maternal Grandmother    • Maternal Grandfather    • Paternal Grandmother    • Paternal Grandfather    • Daughter Alive     Family History   Problem Relation Age of Onset   • Heart Disease Mother    • Diabetes Mother    • Diabetes Sister    • Diabetes Brother        ROS:    Respiratory: Negative for cough, sputum production, shortness of breath or wheezing.    Cardiovascular: Negative for chest pain, palpitations, orthopnea, dyspnea with exertion or edema.   Gastrointestinal: Negative for GI upset, nausea, vomiting, abdominal pain, constipation or diarrhea.   Genitourinary: Negative for dysuria, urgency, hesitancy or frequency.       Exam:    Blood  "pressure 120/70, pulse 90, temperature 36.6 °C (97.9 °F), resp. rate 18, height 1.791 m (5' 10.5\"), weight 114.8 kg (253 lb), SpO2 96 %.  General:  Well nourished, well developed male in NAD.  Skin: Multiple hyperkeratoses.  3 lesions across the top of his ear are present hyperpigmented no erythema.  Multiple lesions on the arms bilaterally  Spine: Low lumbar discomfort to palpation but no localization to the vertebral body.  No vertebral muscle spasm, negative straight leg testing.  Lower extremity with no weakness  Pulmonary: Clear to ausculation and percussion.  Normal effort. No rales, rhonchi, or wheezing.  Cardiovascular: Regular rate and rhythm without murmur.   Abdomen: Normal bowel sounds soft and nontender no palpable liver spleen bladder mass.  Extremities: No LE edema noted.  Neuro: Grossly nonfocal.  Psych: Alert and oriented to person, place, and time. Appropriate mood and conversation.        This dictation was created using voice recognition software. I have made reasonable attempts to correct errors, however, errors of grammar and content may exist.          Assessment/Plan:    1. Ruptured lumbar disc  Chronic pain and the patient declines any referral for any additional intervention.  He does not recognize any history of a medication trial of Celebrex will go ahead and institute that.  Monitor, discussed with patient options for additional intervention if the pain progresses or he would like to proceed to additional workup or therapy trials.    2. Hyperkeratosis  Multiple hyperkeratosis liquid nitrogen is applied to the 3 lesions on the auricle of the ear and one on each forearm.    3. Class 1 obesity due to excess calories without serious comorbidity with body mass index (BMI) of 30.0 to 30.9 in adult  Discussed diet, exercise, weight loss, behavioral modification, portion size management.      4. Essential hypertension  Will be due for lab workup in November December continuing on lisinopril no " persistence of cough after his upper respiratory infection.    5. Fasting hyperglycemia  Implications of weight gain prior fasting hyperglycemia.  Reviewed diet weight loss, labs scheduled for November.       Patient was seen for 25 minutes face to face of which more than 50% of the time was spent in counseling and coordination of care regarding the above problems.

## 2018-06-28 NOTE — ASSESSMENT & PLAN NOTE
Not following at home does continue on medications no chest pain palpitations edema no headaches or visual changes.

## 2018-06-28 NOTE — ASSESSMENT & PLAN NOTE
Weight tends to wax and wane, he states he was as low as 230 pounds but is gradually regaining this.

## 2018-07-05 ENCOUNTER — TELEPHONE (OUTPATIENT)
Dept: MEDICAL GROUP | Facility: PHYSICIAN GROUP | Age: 63
End: 2018-07-05

## 2018-07-05 NOTE — TELEPHONE ENCOUNTER
FINAL PRIOR AUTHORIZATION STATUS:    1.  Name of Medication & Dose: Celecoxib 100 MG caps     2. Prior Auth Status: Approved through [facility]     3. Action Taken: Pharmacy Notified: N\A Patient Notified: N\A

## 2019-01-07 ENCOUNTER — HOSPITAL ENCOUNTER (OUTPATIENT)
Dept: LAB | Facility: MEDICAL CENTER | Age: 64
End: 2019-01-07
Attending: INTERNAL MEDICINE
Payer: MEDICARE

## 2019-01-07 LAB — URATE SERPL-MCNC: 5 MG/DL (ref 2.5–8.3)

## 2019-01-07 PROCEDURE — 84550 ASSAY OF BLOOD/URIC ACID: CPT

## 2019-01-07 PROCEDURE — 36415 COLL VENOUS BLD VENIPUNCTURE: CPT

## 2019-01-10 ENCOUNTER — OFFICE VISIT (OUTPATIENT)
Dept: MEDICAL GROUP | Facility: PHYSICIAN GROUP | Age: 64
End: 2019-01-10
Payer: MEDICARE

## 2019-01-10 VITALS
HEIGHT: 71 IN | SYSTOLIC BLOOD PRESSURE: 104 MMHG | HEART RATE: 87 BPM | WEIGHT: 247 LBS | TEMPERATURE: 98.3 F | BODY MASS INDEX: 34.58 KG/M2 | DIASTOLIC BLOOD PRESSURE: 70 MMHG | RESPIRATION RATE: 20 BRPM | OXYGEN SATURATION: 96 %

## 2019-01-10 DIAGNOSIS — R73.01 FASTING HYPERGLYCEMIA: ICD-10-CM

## 2019-01-10 DIAGNOSIS — I10 ESSENTIAL HYPERTENSION: ICD-10-CM

## 2019-01-10 DIAGNOSIS — M51.26 RUPTURED LUMBAR DISC: ICD-10-CM

## 2019-01-10 DIAGNOSIS — Z87.39 H/O: GOUT: ICD-10-CM

## 2019-01-10 DIAGNOSIS — E78.5 DYSLIPIDEMIA: ICD-10-CM

## 2019-01-10 DIAGNOSIS — F51.01 PRIMARY INSOMNIA: ICD-10-CM

## 2019-01-10 DIAGNOSIS — F32.A DEPRESSION, UNSPECIFIED DEPRESSION TYPE: ICD-10-CM

## 2019-01-10 DIAGNOSIS — H61.22 IMPACTED CERUMEN OF LEFT EAR: ICD-10-CM

## 2019-01-10 PROCEDURE — 99214 OFFICE O/P EST MOD 30 MIN: CPT | Performed by: INTERNAL MEDICINE

## 2019-01-10 RX ORDER — LISINOPRIL 20 MG/1
20 TABLET ORAL 2 TIMES DAILY
Qty: 180 TAB | Refills: 3 | Status: SHIPPED | OUTPATIENT
Start: 2019-01-10 | End: 2019-12-23 | Stop reason: SDUPTHER

## 2019-01-10 RX ORDER — CYCLOBENZAPRINE HCL 10 MG
10 TABLET ORAL NIGHTLY PRN
Qty: 30 TAB | Refills: 0 | Status: SHIPPED | OUTPATIENT
Start: 2019-01-10 | End: 2019-12-23

## 2019-01-10 RX ORDER — CELECOXIB 100 MG/1
100 CAPSULE ORAL
Qty: 60 CAP | Refills: 3 | Status: SHIPPED | OUTPATIENT
Start: 2019-01-10 | End: 2019-12-23 | Stop reason: SDUPTHER

## 2019-01-10 RX ORDER — ALLOPURINOL 300 MG/1
300 TABLET ORAL DAILY
Qty: 90 TAB | Refills: 3 | Status: SHIPPED | OUTPATIENT
Start: 2019-01-10 | End: 2019-12-23 | Stop reason: SDUPTHER

## 2019-01-10 RX ORDER — LOVASTATIN 40 MG/1
40 TABLET ORAL DAILY
Qty: 90 TAB | Refills: 3 | Status: SHIPPED | OUTPATIENT
Start: 2019-01-10 | End: 2019-12-23 | Stop reason: SDUPTHER

## 2019-01-10 RX ORDER — NIACIN 500 MG/1
500 TABLET, EXTENDED RELEASE ORAL DAILY
Qty: 90 TAB | Refills: 1 | Status: SHIPPED | OUTPATIENT
Start: 2019-01-10 | End: 2019-12-23 | Stop reason: SDUPTHER

## 2019-01-10 NOTE — ASSESSMENT & PLAN NOTE
Patient complaining of decreased hearing bilateral ears.  Worse on the right than the left.  Denies sinus congestion no fevers chills or cough.

## 2019-01-10 NOTE — ASSESSMENT & PLAN NOTE
At baseline, states some more pain.  States some leg pain into the low back.  He has not been using the nsaids.  He has not been doing chiropractic, not exercising.  If he is up more than 1 mi he will get muscle spasms. He is not using the flexeril, felt that it was making him sleepy.  Has seen sheron cornell and Dr Sena for injections but not too helpful.

## 2019-01-10 NOTE — PROGRESS NOTES
Chief Complaint   Patient presents with   • Hypertension     lab results    • Ear Fullness     bilateral ears plugged x2weeks    • Medication Refill     all meds refilled except flexeril        HISTORY OF PRESENT ILLNESS: Patient is a 63 y.o. male established patient who presents today to discuss the medical issues below.    Ruptured lumbar disc  At baseline, states some more pain.  States some leg pain into the low back.  He has not been using the nsaids.  He has not been doing chiropractic, not exercising.  If he is up more than 1 mi he will get muscle spasms. He is not using the flexeril, felt that it was making him sleepy.  Has seen sheron cornell and Dr Sena for injections but not too helpful.      Fasting hyperglycemia  Admits to some dietary issues with the Holiday.      H/O: gout  No recent exacerbation, continues on the allopurinol at 300 mg a day, had labs done.      Primary insomnia  Patient feels he is sleeping 5-6 hours a nite, tends to be awake around 2 am, primarily due to aching.  Back and shoulders.  He has been out of the celebrex x 5-6 mos, he felt that was making him sleepy.      Depression  Patient denies depression.      Impacted cerumen of left ear  Patient complaining of decreased hearing bilateral ears.  Worse on the right than the left.  Denies sinus congestion no fevers chills or cough.      Patient Active Problem List    Diagnosis Date Noted   • Fasting hyperglycemia 03/23/2017     Priority: Medium   • Mild vitamin D deficiency 04/19/2012     Priority: Medium   • Obesity 04/19/2012     Priority: Medium   • HTN (hypertension) 04/11/2012     Priority: Medium   • Impacted cerumen of left ear 01/10/2019   • Cough 09/15/2017   • Primary insomnia 09/21/2016   • Peyronie disease 09/21/2016   • History of colonic polyps 02/03/2016   • Hyperkeratosis 10/01/2015   • Dyslipidemia 06/14/2012   • Ruptured lumbar disc 12/01/2011   • H/O: gout 12/01/2011   • Depression 12/01/2011        Allergies:Patient has no known allergies.    Current Outpatient Prescriptions   Medication Sig Dispense Refill   • celecoxib (CELEBREX) 100 MG Cap Take 1 Cap by mouth 2 times daily with meals as needed. 60 Cap 3   • cyclobenzaprine (FLEXERIL) 10 MG Tab Take 1 Tab by mouth at bedtime as needed. 30 Tab 0   • lisinopril (PRINIVIL) 20 MG Tab Take 1 Tab by mouth 2 times a day. 180 Tab 3   • allopurinol (ZYLOPRIM) 300 MG Tab Take 1 Tab by mouth every day. 90 Tab 3   • lovastatin (MEVACOR) 40 MG tablet Take 1 Tab by mouth every day. 90 Tab 3   • niacin SR (NIASPAN) 500 MG Tab CR Take 1 Tab by mouth every day. 90 Tab 1   • Vitamin D, Cholecalciferol, 1000 UNITS Tab Take  by mouth.       No current facility-administered medications for this visit.          Past Medical History:   Diagnosis Date   • Depression 2011   • Fasting hyperglycemia 3/23/2017   • GOUT 2011   • Hyperkeratosis 10/1/2015   • Peyronie disease 2016   • Primary insomnia 2016   • Ruptured lumbar disc 2011       Social History   Substance Use Topics   • Smoking status: Former Smoker     Years: 10.00     Types: Cigarettes     Quit date: 4/10/1985   • Smokeless tobacco: Former User     Types: Chew     Quit date: 3/15/2014      Comment: x 30 years - 7.12.12 thinking about it   • Alcohol use 0.0 oz/week      Comment: rare       Family Status   Relation Status   • Mo  at age 72        CHF   • Sis Alive   • Fa  at age 86        MI   • Bro Alive   • MGMo    • MGFa    • PGMo    • PGFa    • Marina Alive     Family History   Problem Relation Age of Onset   • Heart Disease Mother    • Diabetes Mother    • Diabetes Sister    • Diabetes Brother        ROS:    Respiratory: Negative for cough, sputum production, shortness of breath or wheezing.    Cardiovascular: Negative for chest pain, palpitations, orthopnea, dyspnea with exertion or edema.   Gastrointestinal: Negative for GI upset, nausea,  "vomiting, abdominal pain, constipation or diarrhea.   Genitourinary: Negative for dysuria, urgency, hesitancy or frequency.       Exam:    Blood pressure 104/70, pulse 87, temperature 36.8 °C (98.3 °F), temperature source Temporal, resp. rate 20, height 1.791 m (5' 10.5\"), weight 112 kg (247 lb), SpO2 96 %.  General:  Well nourished, well developed male in NAD.  HENT: Left TM is completely occluded with cerumen, right is retracted.  Nasal mucosa with diffuse edema no sinus tenderness to percussion.  Oral mucosa with no lesions  Spine: No vertebral or vertebral angle tenderness negative straight leg testing.  The point of most tenderness of bilateral SI joints  Pulmonary: Clear to ausculation and percussion.  Normal effort. No rales, rhonchi, or wheezing.  Cardiovascular: Regular rate and rhythm without murmur.   Abdomen: Normal bowel sounds soft and nontender no palpable liver spleen bladder mass.  Extremities: No LE edema noted.  Neuro: Grossly nonfocal.  Psych: Alert and oriented to person, place, and time. Appropriate mood and conversation.    LABS: Results reviewed and discussed with the patient, questions answered.      This dictation was created using voice recognition software. I have made reasonable attempts to correct errors, however, errors of grammar and content may exist.          Assessment/Plan:    1. Ruptured lumbar disc  Chronic pain some exacerbation physical exam unremarkable.  Discussed options.  Weight loss, stretching exercises.  Patient opts for chiropractic.  Wants to hold off on referral back to spine or physiatry.  Discussed anti-inflammatories muscle relaxants, early follow-up as needed lack of improvement or deciding to proceed to referrals.  No focal neurologic  - celecoxib (CELEBREX) 100 MG Cap; Take 1 Cap by mouth 2 times daily with meals as needed.  Dispense: 60 Cap; Refill: 3  - cyclobenzaprine (FLEXERIL) 10 MG Tab; Take 1 Tab by mouth at bedtime as needed.  Dispense: 30 Tab; Refill: " 0    2. Fasting hyperglycemia  Slightly further elevation of the A1c.  Weight ongoing problem reviewed diet weight loss    3. H/O: gout  Uric acid level good continues on allopurinol refills written.  - allopurinol (ZYLOPRIM) 300 MG Tab; Take 1 Tab by mouth every day.  Dispense: 90 Tab; Refill: 3    4. Primary insomnia  Continues with difficulty sleeping.  Monitor with better pain control with the Celebrex muscle relaxant.  Discussed over-the-counter as needed antihistamines as discussed below.    5. Depression, unspecified depression type  Patient denies depression    6. Essential hypertension  Well-controlled refills written renal function preserved  - lisinopril (PRINIVIL) 20 MG Tab; Take 1 Tab by mouth 2 times a day.  Dispense: 180 Tab; Refill: 3    7. Dyslipidemia  - lovastatin (MEVACOR) 40 MG tablet; Take 1 Tab by mouth every day.  Dispense: 90 Tab; Refill: 3  - niacin SR (NIASPAN) 500 MG Tab CR; Take 1 Tab by mouth every day.  Dispense: 90 Tab; Refill: 1    8. Dyslipidemia  Last LDL cholesterol adequately controlled refills written.  - lovastatin (MEVACOR) 40 MG tablet; Take 1 Tab by mouth every day.  Dispense: 90 Tab; Refill: 3  - niacin SR (NIASPAN) 500 MG Tab CR; Take 1 Tab by mouth every day.  Dispense: 90 Tab; Refill: 1    9. Impacted cerumen of left ear  Lavage today.  Congestion more likely a component of allergic congestion.  Discussed over-the-counter nonsedating antihistamines monitoring.  - Ear Cerumen Removal       Patient was seen for 25 minutes face to face of which more than 50% of the time was spent in counseling and coordination of care regarding the above problems.

## 2019-01-10 NOTE — ASSESSMENT & PLAN NOTE
Patient feels he is sleeping 5-6 hours a nite, tends to be awake around 2 am, primarily due to aching.  Back and shoulders.  He has been out of the celebrex x 5-6 mos, he felt that was making him sleepy.

## 2019-12-23 ENCOUNTER — OFFICE VISIT (OUTPATIENT)
Dept: MEDICAL GROUP | Facility: PHYSICIAN GROUP | Age: 64
End: 2019-12-23
Payer: MEDICARE

## 2019-12-23 VITALS
SYSTOLIC BLOOD PRESSURE: 112 MMHG | RESPIRATION RATE: 16 BRPM | DIASTOLIC BLOOD PRESSURE: 76 MMHG | HEART RATE: 74 BPM | WEIGHT: 246 LBS | OXYGEN SATURATION: 95 % | HEIGHT: 70 IN | BODY MASS INDEX: 35.22 KG/M2 | TEMPERATURE: 98.2 F

## 2019-12-23 DIAGNOSIS — E55.9 MILD VITAMIN D DEFICIENCY: ICD-10-CM

## 2019-12-23 DIAGNOSIS — E66.09 CLASS 1 OBESITY DUE TO EXCESS CALORIES WITHOUT SERIOUS COMORBIDITY WITH BODY MASS INDEX (BMI) OF 30.0 TO 30.9 IN ADULT: ICD-10-CM

## 2019-12-23 DIAGNOSIS — I10 ESSENTIAL HYPERTENSION: ICD-10-CM

## 2019-12-23 DIAGNOSIS — E78.5 DYSLIPIDEMIA: ICD-10-CM

## 2019-12-23 DIAGNOSIS — R73.01 FASTING HYPERGLYCEMIA: ICD-10-CM

## 2019-12-23 DIAGNOSIS — Z87.39 H/O: GOUT: ICD-10-CM

## 2019-12-23 DIAGNOSIS — M51.26 RUPTURED LUMBAR DISC: ICD-10-CM

## 2019-12-23 PROBLEM — R05.9 COUGH: Status: RESOLVED | Noted: 2017-09-15 | Resolved: 2019-12-23

## 2019-12-23 PROCEDURE — 99214 OFFICE O/P EST MOD 30 MIN: CPT | Performed by: INTERNAL MEDICINE

## 2019-12-23 RX ORDER — ALLOPURINOL 300 MG/1
300 TABLET ORAL DAILY
Qty: 90 TAB | Refills: 3 | Status: SHIPPED | OUTPATIENT
Start: 2019-12-23 | End: 2020-10-07 | Stop reason: SDUPTHER

## 2019-12-23 RX ORDER — CELECOXIB 100 MG/1
100 CAPSULE ORAL
Qty: 60 CAP | Refills: 5 | Status: SHIPPED | OUTPATIENT
Start: 2019-12-23 | End: 2020-03-30 | Stop reason: SDUPTHER

## 2019-12-23 RX ORDER — NIACIN 500 MG/1
500 TABLET, EXTENDED RELEASE ORAL DAILY
Qty: 90 TAB | Refills: 1 | Status: SHIPPED
Start: 2019-12-23 | End: 2020-03-30

## 2019-12-23 RX ORDER — LOVASTATIN 40 MG/1
40 TABLET ORAL DAILY
Qty: 90 TAB | Refills: 3 | Status: SHIPPED | OUTPATIENT
Start: 2019-12-23 | End: 2020-10-07 | Stop reason: SDUPTHER

## 2019-12-23 RX ORDER — LISINOPRIL 20 MG/1
20 TABLET ORAL 2 TIMES DAILY
Qty: 180 TAB | Refills: 3 | Status: SHIPPED | OUTPATIENT
Start: 2019-12-23 | End: 2020-10-07

## 2019-12-23 ASSESSMENT — PATIENT HEALTH QUESTIONNAIRE - PHQ9
9. THOUGHTS THAT YOU WOULD BE BETTER OFF DEAD, OR OF HURTING YOURSELF: NOT AT ALL
2. FEELING DOWN, DEPRESSED, IRRITABLE, OR HOPELESS: NOT AT ALL
SUM OF ALL RESPONSES TO PHQ QUESTIONS 1-9: 4
SUM OF ALL RESPONSES TO PHQ9 QUESTIONS 1 AND 2: 1
7. TROUBLE CONCENTRATING ON THINGS, SUCH AS READING THE NEWSPAPER OR WATCHING TELEVISION: SEVERAL DAYS
8. MOVING OR SPEAKING SO SLOWLY THAT OTHER PEOPLE COULD HAVE NOTICED. OR THE OPPOSITE, BEING SO FIGETY OR RESTLESS THAT YOU HAVE BEEN MOVING AROUND A LOT MORE THAN USUAL: NOT AT ALL
4. FEELING TIRED OR HAVING LITTLE ENERGY: SEVERAL DAYS
6. FEELING BAD ABOUT YOURSELF - OR THAT YOU ARE A FAILURE OR HAVE LET YOURSELF OR YOUR FAMILY DOWN: NOT AL ALL
3. TROUBLE FALLING OR STAYING ASLEEP OR SLEEPING TOO MUCH: SEVERAL DAYS
1. LITTLE INTEREST OR PLEASURE IN DOING THINGS: SEVERAL DAYS
5. POOR APPETITE OR OVEREATING: NOT AT ALL

## 2019-12-23 NOTE — PROGRESS NOTES
Chief Complaint   Patient presents with   • Hypertension     med refill   • Hyperlipidemia     med refill   • Gout     med refill       HISTORY OF PRESENT ILLNESS: Patient is a 64 y.o. male established patient who presents today to discuss the medical issues below.    HTN (hypertension)  States bp at baseline continues with the lisinopril.  No chest pain palpitations or edema.    Dyslipidemia  Patient continues on the statin and hiacin.      Fasting hyperglycemia  Weight is about the same, not following diet.      Ruptured lumbar disc  Doing fairly well with good back mechanics, states good year this year.  Not doing back exercises.  Does have am stiffness.      Mild vitamin D deficiency  Patient reports he is taking vitamin D as part of a multivitamin.    Obesity  Weight is essentially the same.    H/O: gout  Has had some trigger finger locking but denies any gouty attacks.      Patient Active Problem List    Diagnosis Date Noted   • Fasting hyperglycemia 03/23/2017     Priority: Medium   • Mild vitamin D deficiency 04/19/2012     Priority: Medium   • Obesity 04/19/2012     Priority: Medium   • HTN (hypertension) 04/11/2012     Priority: Medium   • Impacted cerumen of left ear 01/10/2019   • Primary insomnia 09/21/2016   • Peyronie disease 09/21/2016   • History of colonic polyps 02/03/2016   • Hyperkeratosis 10/01/2015   • Dyslipidemia 06/14/2012   • Ruptured lumbar disc 12/01/2011   • H/O: gout 12/01/2011   • Depression 12/01/2011       Allergies:Patient has no known allergies.    Current Outpatient Medications   Medication Sig Dispense Refill   • lisinopril (PRINIVIL) 20 MG Tab Take 1 Tab by mouth 2 times a day. 180 Tab 3   • allopurinol (ZYLOPRIM) 300 MG Tab Take 1 Tab by mouth every day. 90 Tab 3   • lovastatin (MEVACOR) 40 MG tablet Take 1 Tab by mouth every day. 90 Tab 3   • niacin SR (NIASPAN) 500 MG Tab CR Take 1 Tab by mouth every day. 90 Tab 1   • celecoxib (CELEBREX) 100 MG Cap Take 1 Cap by mouth 2  "times daily with meals as needed. 60 Cap 5   • Vitamin D, Cholecalciferol, 1000 UNITS Tab Take  by mouth.       No current facility-administered medications for this visit.          Past Medical History:   Diagnosis Date   • Depression 2011   • Fasting hyperglycemia 3/23/2017   • GOUT 2011   • Hyperkeratosis 10/1/2015   • Peyronie disease 2016   • Primary insomnia 2016   • Ruptured lumbar disc 2011       Social History     Tobacco Use   • Smoking status: Former Smoker     Years: 10.00     Types: Cigarettes     Last attempt to quit: 4/10/1985     Years since quittin.7   • Smokeless tobacco: Former User     Types: Chew     Quit date: 3/15/2014   • Tobacco comment: x 30 years - . thinking about it   Substance Use Topics   • Alcohol use: Yes     Alcohol/week: 0.0 oz     Comment: rare   • Drug use: No       Family Status   Relation Name Status   • Mo   at age 72        CHF   • Sis  Alive   • Fa   at age 86        MI   • Bro  Alive   • MGMo     • MGFa     • PGMo     • PGFa     • Marina  Alive     Family History   Problem Relation Age of Onset   • Heart Disease Mother    • Diabetes Mother    • Diabetes Sister    • Diabetes Brother        ROS:    Respiratory: Negative for cough, sputum production, shortness of breath or wheezing.    Cardiovascular: Negative for chest pain, palpitations, orthopnea, dyspnea with exertion or edema.   Gastrointestinal: Negative for GI upset, nausea, vomiting, abdominal pain, constipation or diarrhea.   Genitourinary: Negative for dysuria, urgency, hesitancy or frequency.       Exam:    /76 (BP Location: Right arm, Patient Position: Sitting, BP Cuff Size: Adult)   Pulse 74   Temp 36.8 °C (98.2 °F) (Temporal)   Resp 16   Ht 1.778 m (5' 10\")   Wt 111.6 kg (246 lb)   SpO2 95%   General:  Well nourished, well developed male in NAD.  HENT: Normocephalic, bilateral TMs are intact, nasal and oral mucosa with no " lesions,   Neck: Supple without bruit. Thyroid is not enlarged.  Pulmonary: Clear to ausculation and percussion.  Normal effort. No rales, rhonchi, or wheezing.  Cardiovascular: Regular rate and rhythm without murmur.   Abdomen: Normal bowel sounds soft and nontender no palpable liver spleen bladder mass.  Extremities: No LE edema noted.  Neuro: Grossly nonfocal.  Psych: Alert and oriented to person, place, and time. Appropriate mood and conversation.    No recent labs  This dictation was created using voice recognition software. I have made reasonable attempts to correct errors, however, errors of grammar and content may exist.          Assessment/Plan:    1. Essential hypertension  Stable continuing on meds refills written labs ordered  - Comp Metabolic Panel; Future  - CBC WITH DIFFERENTIAL; Future  - lisinopril (PRINIVIL) 20 MG Tab; Take 1 Tab by mouth 2 times a day.  Dispense: 180 Tab; Refill: 3    2. Dyslipidemia  Due for labs continues on statin.  Tolerating statin well.  - Lipid Profile; Future  - lovastatin (MEVACOR) 40 MG tablet; Take 1 Tab by mouth every day.  Dispense: 90 Tab; Refill: 3  - niacin SR (NIASPAN) 500 MG Tab CR; Take 1 Tab by mouth every day.  Dispense: 90 Tab; Refill: 1    3. Fasting hyperglycemia  Weight essentially unchanged unknown degree of control regarding the hyperglycemia.  Add labs 3-month follow-up.  - HEMOGLOBIN A1C; Future    4. Ruptured lumbar disc  Reviewed good back mechanics.  Restarted on the Celebrex which he has not really been taking.  Offered referral to physical therapy for good back mechanics however he declines.  Back booklet given.  Monitor with PRN Celebrex  - celecoxib (CELEBREX) 100 MG Cap; Take 1 Cap by mouth 2 times daily with meals as needed.  Dispense: 60 Cap; Refill: 5    5. H/O: gout  Overdue for labs  - URIC ACID, SERUM  - allopurinol (ZYLOPRIM) 300 MG Tab; Take 1 Tab by mouth every day.  Dispense: 90 Tab; Refill: 3    6. Dyslipidemia  Overdue for labs  refills written  - Lipid Profile; Future  - lovastatin (MEVACOR) 40 MG tablet; Take 1 Tab by mouth every day.  Dispense: 90 Tab; Refill: 3  - niacin SR (NIASPAN) 500 MG Tab CR; Take 1 Tab by mouth every day.  Dispense: 90 Tab; Refill: 1    7. Mild vitamin D deficiency  Assess lab level  - VITAMIN D,25 HYDROXY; Future    8. Class 1 obesity due to excess calories without serious comorbidity with body mass index (BMI) of 30.0 to 30.9 in adult  At baseline.       Patient was seen for 25 minutes face to face of which more than 50% of the time was spent in counseling and coordination of care regarding the above problems.

## 2019-12-23 NOTE — ASSESSMENT & PLAN NOTE
Doing fairly well with good back mechanics, states good year this year.  Not doing back exercises.  Does have am stiffness.

## 2020-03-24 ENCOUNTER — HOSPITAL ENCOUNTER (OUTPATIENT)
Dept: LAB | Facility: MEDICAL CENTER | Age: 65
End: 2020-03-24
Attending: INTERNAL MEDICINE
Payer: MEDICARE

## 2020-03-24 DIAGNOSIS — E78.5 DYSLIPIDEMIA: ICD-10-CM

## 2020-03-24 DIAGNOSIS — I10 ESSENTIAL HYPERTENSION: ICD-10-CM

## 2020-03-24 DIAGNOSIS — E55.9 MILD VITAMIN D DEFICIENCY: ICD-10-CM

## 2020-03-24 DIAGNOSIS — R73.01 FASTING HYPERGLYCEMIA: ICD-10-CM

## 2020-03-24 LAB
25(OH)D3 SERPL-MCNC: 24 NG/ML (ref 30–100)
ALBUMIN SERPL BCP-MCNC: 4.5 G/DL (ref 3.2–4.9)
ALBUMIN/GLOB SERPL: 1.5 G/DL
ALP SERPL-CCNC: 78 U/L (ref 30–99)
ALT SERPL-CCNC: 32 U/L (ref 2–50)
ANION GAP SERPL CALC-SCNC: 14 MMOL/L (ref 7–16)
AST SERPL-CCNC: 24 U/L (ref 12–45)
BASOPHILS # BLD AUTO: 0.8 % (ref 0–1.8)
BASOPHILS # BLD: 0.06 K/UL (ref 0–0.12)
BILIRUB SERPL-MCNC: 0.8 MG/DL (ref 0.1–1.5)
BUN SERPL-MCNC: 17 MG/DL (ref 8–22)
CALCIUM SERPL-MCNC: 9.4 MG/DL (ref 8.5–10.5)
CHLORIDE SERPL-SCNC: 105 MMOL/L (ref 96–112)
CHOLEST SERPL-MCNC: 155 MG/DL (ref 100–199)
CO2 SERPL-SCNC: 20 MMOL/L (ref 20–33)
CREAT SERPL-MCNC: 0.78 MG/DL (ref 0.5–1.4)
EOSINOPHIL # BLD AUTO: 0.09 K/UL (ref 0–0.51)
EOSINOPHIL NFR BLD: 1.2 % (ref 0–6.9)
ERYTHROCYTE [DISTWIDTH] IN BLOOD BY AUTOMATED COUNT: 43.8 FL (ref 35.9–50)
FASTING STATUS PATIENT QL REPORTED: NORMAL
GLOBULIN SER CALC-MCNC: 3 G/DL (ref 1.9–3.5)
GLUCOSE SERPL-MCNC: 126 MG/DL (ref 65–99)
HCT VFR BLD AUTO: 48.8 % (ref 42–52)
HDLC SERPL-MCNC: 32 MG/DL
HGB BLD-MCNC: 17.1 G/DL (ref 14–18)
IMM GRANULOCYTES # BLD AUTO: 0.01 K/UL (ref 0–0.11)
IMM GRANULOCYTES NFR BLD AUTO: 0.1 % (ref 0–0.9)
LDLC SERPL CALC-MCNC: 75 MG/DL
LYMPHOCYTES # BLD AUTO: 2.63 K/UL (ref 1–4.8)
LYMPHOCYTES NFR BLD: 36.4 % (ref 22–41)
MCH RBC QN AUTO: 33.9 PG (ref 27–33)
MCHC RBC AUTO-ENTMCNC: 35 G/DL (ref 33.7–35.3)
MCV RBC AUTO: 96.6 FL (ref 81.4–97.8)
MONOCYTES # BLD AUTO: 0.45 K/UL (ref 0–0.85)
MONOCYTES NFR BLD AUTO: 6.2 % (ref 0–13.4)
NEUTROPHILS # BLD AUTO: 3.98 K/UL (ref 1.82–7.42)
NEUTROPHILS NFR BLD: 55.3 % (ref 44–72)
NRBC # BLD AUTO: 0 K/UL
NRBC BLD-RTO: 0 /100 WBC
PLATELET # BLD AUTO: 203 K/UL (ref 164–446)
PMV BLD AUTO: 10.2 FL (ref 9–12.9)
POTASSIUM SERPL-SCNC: 4.1 MMOL/L (ref 3.6–5.5)
PROT SERPL-MCNC: 7.5 G/DL (ref 6–8.2)
RBC # BLD AUTO: 5.05 M/UL (ref 4.7–6.1)
SODIUM SERPL-SCNC: 139 MMOL/L (ref 135–145)
TRIGL SERPL-MCNC: 242 MG/DL (ref 0–149)
URATE SERPL-MCNC: 5.4 MG/DL (ref 2.5–8.3)
WBC # BLD AUTO: 7.2 K/UL (ref 4.8–10.8)

## 2020-03-24 PROCEDURE — 36415 COLL VENOUS BLD VENIPUNCTURE: CPT

## 2020-03-24 PROCEDURE — 80053 COMPREHEN METABOLIC PANEL: CPT

## 2020-03-24 PROCEDURE — 84550 ASSAY OF BLOOD/URIC ACID: CPT

## 2020-03-24 PROCEDURE — 82306 VITAMIN D 25 HYDROXY: CPT

## 2020-03-24 PROCEDURE — 83036 HEMOGLOBIN GLYCOSYLATED A1C: CPT | Mod: GA

## 2020-03-24 PROCEDURE — 85025 COMPLETE CBC W/AUTO DIFF WBC: CPT

## 2020-03-24 PROCEDURE — 80061 LIPID PANEL: CPT

## 2020-03-25 LAB
EST. AVERAGE GLUCOSE BLD GHB EST-MCNC: 111 MG/DL
HBA1C MFR BLD: 5.5 % (ref 0–5.6)

## 2020-03-30 ENCOUNTER — OFFICE VISIT (OUTPATIENT)
Dept: MEDICAL GROUP | Facility: PHYSICIAN GROUP | Age: 65
End: 2020-03-30
Payer: MEDICARE

## 2020-03-30 VITALS
OXYGEN SATURATION: 95 % | BODY MASS INDEX: 35.5 KG/M2 | HEART RATE: 84 BPM | WEIGHT: 248 LBS | SYSTOLIC BLOOD PRESSURE: 120 MMHG | DIASTOLIC BLOOD PRESSURE: 70 MMHG | TEMPERATURE: 97.3 F | RESPIRATION RATE: 12 BRPM | HEIGHT: 70 IN

## 2020-03-30 DIAGNOSIS — I10 ESSENTIAL HYPERTENSION: ICD-10-CM

## 2020-03-30 DIAGNOSIS — E78.5 DYSLIPIDEMIA: ICD-10-CM

## 2020-03-30 DIAGNOSIS — E66.09 CLASS 1 OBESITY DUE TO EXCESS CALORIES WITHOUT SERIOUS COMORBIDITY WITH BODY MASS INDEX (BMI) OF 30.0 TO 30.9 IN ADULT: ICD-10-CM

## 2020-03-30 DIAGNOSIS — M51.26 RUPTURED LUMBAR DISC: ICD-10-CM

## 2020-03-30 DIAGNOSIS — Z87.39 H/O: GOUT: ICD-10-CM

## 2020-03-30 DIAGNOSIS — R73.01 FASTING HYPERGLYCEMIA: ICD-10-CM

## 2020-03-30 DIAGNOSIS — E55.9 MILD VITAMIN D DEFICIENCY: ICD-10-CM

## 2020-03-30 PROCEDURE — 99214 OFFICE O/P EST MOD 30 MIN: CPT | Performed by: INTERNAL MEDICINE

## 2020-03-30 RX ORDER — CELECOXIB 100 MG/1
100 CAPSULE ORAL
Qty: 180 CAP | Refills: 3 | Status: SHIPPED | OUTPATIENT
Start: 2020-03-30 | End: 2021-04-14

## 2020-03-30 RX ORDER — NIACIN 500 MG
500 TABLET ORAL DAILY
Qty: 90 TAB | Refills: 3 | Status: SHIPPED | OUTPATIENT
Start: 2020-03-30

## 2020-03-30 ASSESSMENT — FIBROSIS 4 INDEX: FIB4 SCORE: 1.38

## 2020-03-30 NOTE — NON-PROVIDER
Chief Complaint   Patient presents with   • Lab Results       HISTORY OF PRESENT ILLNESS: Patient is a 64 y.o. male established patient who presents today to discuss the medical issues below.    Obesity  Patient states that he does not routinely exercise however he does try to watch his portion control and eat healthier.  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    Fasting hyperglycemia  Current blood glucose was 126 with an A1c of 5.5.  Patient states that he does try to monitor his sugar intake    Ruptured lumbar disc  Patient does continue to have lower back pain worse after working all day.  He states that laying in his recliner does help along with occasionally taking 2 Aleve about 3 times a week.    HTN (hypertension)  Patient states that he follows his blood pressure at home approximately 3 times a month and it runs with a systolic being between 1 10-1 20.  Patient continues on his blood pressure medications.    Mild vitamin D deficiency  Patient states that he takes 1000  grams of vitamin D daily now discussed increasing it up to 2000 mg daily    H/O: gout  Patient continues to take his allopurinol and denies any gout flareups. Reviewed labs    Dyslipidemia  Patient continues on his lovastatin 40 mg tablet daily LDL is currently 75 reviewed labs.     Patient Active Problem List    Diagnosis Date Noted   • Fasting hyperglycemia 03/23/2017     Priority: Medium   • Mild vitamin D deficiency 04/19/2012     Priority: Medium   • Obesity 04/19/2012     Priority: Medium   • HTN (hypertension) 04/11/2012     Priority: Medium   • Impacted cerumen of left ear 01/10/2019   • Primary insomnia 09/21/2016   • Peyronie disease 09/21/2016   • History of colonic polyps 02/03/2016   • Hyperkeratosis 10/01/2015   • Dyslipidemia 06/14/2012   • Ruptured lumbar disc 12/01/2011   • H/O: gout 12/01/2011   • Depression 12/01/2011       Allergies:Patient has no known allergies.    Current Outpatient  Medications   Medication Sig Dispense Refill   • lisinopril (PRINIVIL) 20 MG Tab Take 1 Tab by mouth 2 times a day. 180 Tab 3   • allopurinol (ZYLOPRIM) 300 MG Tab Take 1 Tab by mouth every day. 90 Tab 3   • lovastatin (MEVACOR) 40 MG tablet Take 1 Tab by mouth every day. 90 Tab 3   • celecoxib (CELEBREX) 100 MG Cap Take 1 Cap by mouth 2 times daily with meals as needed. 60 Cap 5   • Vitamin D, Cholecalciferol, 1000 UNITS Tab Take  by mouth.     • niacin SR (NIASPAN) 500 MG Tab CR Take 1 Tab by mouth every day. (Patient not taking: Reported on 3/30/2020) 90 Tab 1     No current facility-administered medications for this visit.          Past Medical History:   Diagnosis Date   • Depression 2011   • Fasting hyperglycemia 3/23/2017   • GOUT 2011   • Hyperkeratosis 10/1/2015   • Peyronie disease 2016   • Primary insomnia 2016   • Ruptured lumbar disc 2011       Social History     Tobacco Use   • Smoking status: Former Smoker     Years: 10.00     Types: Cigarettes     Last attempt to quit: 4/10/1985     Years since quittin.9   • Smokeless tobacco: Former User     Types: Chew     Quit date: 3/15/2014   • Tobacco comment: x 30 years - 7.12.12 thinking about it   Substance Use Topics   • Alcohol use: Yes     Alcohol/week: 0.0 oz     Comment: rare   • Drug use: No       Family Status   Relation Name Status   • Mo   at age 72        CHF   • Sis  Alive   • Fa   at age 86        MI   • Bro  Alive   • MGMo     • MGFa     • PGMo     • PGFa     • Marina  Alive     Family History   Problem Relation Age of Onset   • Heart Disease Mother    • Diabetes Mother    • Diabetes Sister    • Diabetes Brother        ROS:    Respiratory: Negative for cough, sputum production, shortness of breath or wheezing.    Cardiovascular: Negative for chest pain, palpitations, orthopnea, dyspnea with exertion or edema.   Gastrointestinal: Negative for GI upset, nausea, vomiting,  "abdominal pain, constipation or diarrhea.   Genitourinary: Negative for dysuria, urgency, hesitancy or frequency.       Exam:    /70 (BP Location: Left arm, Patient Position: Sitting, BP Cuff Size: Adult)   Pulse 84   Temp 36.3 °C (97.3 °F)   Resp 12   Ht 1.778 m (5' 10\")   Wt 112.5 kg (248 lb)   SpO2 95%  Body mass index is 35.58 kg/m².  General:  Well nourished, well developed male in NAD.  HENT: Normocephalic, bilateral TMs are intact, nasal and oral mucosa with no lesions,   Pulmonary: Thorax is symmetric with good equal bilateral expansion. No use of accessory muscles or evidence of retractions. Breath sounds vesicular with no crackles or rhonchi noted. All lung fields resonant without evidence of consolidation. No bronchophony.   Cardiovascular: The heart is regular rate and rhythm. There’s no Murmurs rubs or galops. S1 and S2 are present. There is no S3 heart. There is no S4. The PMI is at the 5th intercostal space at the midclavicular line.   Abdomen: Normal bowel sounds all 4 quadrants, soft and nontender no palpable liver spleen bladder mass.  Extremities: No LE edema noted.  Neuro: Grossly nonfocal.  Psych: Alert and oriented to person, place, and time. Appropriate mood and conversation.        This dictation was created using voice recognition software. I have made reasonable attempts to correct errors, however, errors of grammar and content may exist.          Assessment/Plan:    1. Class 1 obesity due to excess calories without serious comorbidity with body mass index (BMI) of 30.0 to 30.9 in adult  Discussed in depth diet, exercise, weight loss, behavioral modification, portion size management.      2. Fasting hyperglycemia  Patient's blood sugar and A1c level has remained stable.  Reiterated diabetic diet.  We will continue to monitor.    3. Ruptured lumbar disc  Discussed in depth about stretching exercises, and cold therapy to decrease pain.  Patient was educated on use of Celebrex that " was sent to the pharmacy today.  Patient was also told not to take ibuprofen while taking this medication.    4. Essential hypertension  Patient's blood pressure remains within normal limits and patient will continue on blood pressure medications as prescribed.    5. Mild vitamin D deficiency  Patient will continue to take vitamin D supplements    6. H/O: gout  Patient will continue on the allopurinol to decrease gout occurrences.    7. Dyslipidemia  Patient will continue on his lovastatin and we will continue to monitor..         Patient was seen for 25 minutes face to face of which more than 50% of the time was spent in counseling and coordination of care regarding the above problems.

## 2020-03-30 NOTE — PROGRESS NOTES
Patient seen and evaluated with FNP student, concur with documentation and findings, assessment and plan.    Please see additional documentation/note.

## 2020-10-05 ENCOUNTER — HOSPITAL ENCOUNTER (OUTPATIENT)
Dept: LAB | Facility: MEDICAL CENTER | Age: 65
End: 2020-10-05
Attending: INTERNAL MEDICINE
Payer: MEDICARE

## 2020-10-05 DIAGNOSIS — I10 ESSENTIAL HYPERTENSION: ICD-10-CM

## 2020-10-05 DIAGNOSIS — R73.01 FASTING HYPERGLYCEMIA: ICD-10-CM

## 2020-10-05 DIAGNOSIS — E78.5 DYSLIPIDEMIA: ICD-10-CM

## 2020-10-05 LAB
ALBUMIN SERPL BCP-MCNC: 4.5 G/DL (ref 3.2–4.9)
ALBUMIN/GLOB SERPL: 1.6 G/DL
ALP SERPL-CCNC: 74 U/L (ref 30–99)
ALT SERPL-CCNC: 28 U/L (ref 2–50)
ANION GAP SERPL CALC-SCNC: 10 MMOL/L (ref 7–16)
AST SERPL-CCNC: 24 U/L (ref 12–45)
BASOPHILS # BLD AUTO: 0.6 % (ref 0–1.8)
BASOPHILS # BLD: 0.04 K/UL (ref 0–0.12)
BILIRUB SERPL-MCNC: 0.7 MG/DL (ref 0.1–1.5)
BUN SERPL-MCNC: 18 MG/DL (ref 8–22)
CALCIUM SERPL-MCNC: 9.5 MG/DL (ref 8.5–10.5)
CHLORIDE SERPL-SCNC: 103 MMOL/L (ref 96–112)
CHOLEST SERPL-MCNC: 171 MG/DL (ref 100–199)
CO2 SERPL-SCNC: 24 MMOL/L (ref 20–33)
CREAT SERPL-MCNC: 0.92 MG/DL (ref 0.5–1.4)
EOSINOPHIL # BLD AUTO: 0.07 K/UL (ref 0–0.51)
EOSINOPHIL NFR BLD: 1 % (ref 0–6.9)
ERYTHROCYTE [DISTWIDTH] IN BLOOD BY AUTOMATED COUNT: 48.6 FL (ref 35.9–50)
EST. AVERAGE GLUCOSE BLD GHB EST-MCNC: 114 MG/DL
FASTING STATUS PATIENT QL REPORTED: NORMAL
GLOBULIN SER CALC-MCNC: 2.8 G/DL (ref 1.9–3.5)
GLUCOSE SERPL-MCNC: 112 MG/DL (ref 65–99)
HBA1C MFR BLD: 5.6 % (ref 0–5.6)
HCT VFR BLD AUTO: 50 % (ref 42–52)
HDLC SERPL-MCNC: 36 MG/DL
HGB BLD-MCNC: 16.8 G/DL (ref 14–18)
IMM GRANULOCYTES # BLD AUTO: 0.05 K/UL (ref 0–0.11)
IMM GRANULOCYTES NFR BLD AUTO: 0.7 % (ref 0–0.9)
LDLC SERPL CALC-MCNC: 101 MG/DL
LYMPHOCYTES # BLD AUTO: 2.5 K/UL (ref 1–4.8)
LYMPHOCYTES NFR BLD: 35.5 % (ref 22–41)
MCH RBC QN AUTO: 33.9 PG (ref 27–33)
MCHC RBC AUTO-ENTMCNC: 33.6 G/DL (ref 33.7–35.3)
MCV RBC AUTO: 101 FL (ref 81.4–97.8)
MONOCYTES # BLD AUTO: 0.41 K/UL (ref 0–0.85)
MONOCYTES NFR BLD AUTO: 5.8 % (ref 0–13.4)
NEUTROPHILS # BLD AUTO: 3.97 K/UL (ref 1.82–7.42)
NEUTROPHILS NFR BLD: 56.4 % (ref 44–72)
NRBC # BLD AUTO: 0 K/UL
NRBC BLD-RTO: 0 /100 WBC
PLATELET # BLD AUTO: 206 K/UL (ref 164–446)
PMV BLD AUTO: 10.6 FL (ref 9–12.9)
POTASSIUM SERPL-SCNC: 4.6 MMOL/L (ref 3.6–5.5)
PROT SERPL-MCNC: 7.3 G/DL (ref 6–8.2)
RBC # BLD AUTO: 4.95 M/UL (ref 4.7–6.1)
SODIUM SERPL-SCNC: 137 MMOL/L (ref 135–145)
TRIGL SERPL-MCNC: 172 MG/DL (ref 0–149)
WBC # BLD AUTO: 7 K/UL (ref 4.8–10.8)

## 2020-10-05 PROCEDURE — 83036 HEMOGLOBIN GLYCOSYLATED A1C: CPT | Mod: GA

## 2020-10-05 PROCEDURE — 85025 COMPLETE CBC W/AUTO DIFF WBC: CPT

## 2020-10-05 PROCEDURE — 80053 COMPREHEN METABOLIC PANEL: CPT

## 2020-10-05 PROCEDURE — 80061 LIPID PANEL: CPT

## 2020-10-05 PROCEDURE — 36415 COLL VENOUS BLD VENIPUNCTURE: CPT

## 2020-10-07 ENCOUNTER — OFFICE VISIT (OUTPATIENT)
Dept: MEDICAL GROUP | Facility: PHYSICIAN GROUP | Age: 65
End: 2020-10-07
Payer: MEDICARE

## 2020-10-07 VITALS
WEIGHT: 249 LBS | BODY MASS INDEX: 35.65 KG/M2 | TEMPERATURE: 98.2 F | HEART RATE: 103 BPM | OXYGEN SATURATION: 97 % | SYSTOLIC BLOOD PRESSURE: 138 MMHG | DIASTOLIC BLOOD PRESSURE: 80 MMHG | HEIGHT: 70 IN

## 2020-10-07 DIAGNOSIS — E55.9 MILD VITAMIN D DEFICIENCY: ICD-10-CM

## 2020-10-07 DIAGNOSIS — R73.01 FASTING HYPERGLYCEMIA: ICD-10-CM

## 2020-10-07 DIAGNOSIS — E66.09 CLASS 1 OBESITY DUE TO EXCESS CALORIES WITHOUT SERIOUS COMORBIDITY WITH BODY MASS INDEX (BMI) OF 30.0 TO 30.9 IN ADULT: ICD-10-CM

## 2020-10-07 DIAGNOSIS — I10 ESSENTIAL HYPERTENSION: ICD-10-CM

## 2020-10-07 DIAGNOSIS — Z87.39 H/O: GOUT: ICD-10-CM

## 2020-10-07 DIAGNOSIS — E78.5 DYSLIPIDEMIA: ICD-10-CM

## 2020-10-07 PROCEDURE — 99214 OFFICE O/P EST MOD 30 MIN: CPT | Performed by: INTERNAL MEDICINE

## 2020-10-07 RX ORDER — LISINOPRIL 40 MG/1
40 TABLET ORAL DAILY
Qty: 90 TAB | Refills: 3 | Status: SHIPPED | OUTPATIENT
Start: 2020-10-07 | End: 2021-04-14

## 2020-10-07 RX ORDER — LOVASTATIN 40 MG/1
40 TABLET ORAL DAILY
Qty: 90 TAB | Refills: 3 | Status: SHIPPED | OUTPATIENT
Start: 2020-10-07 | End: 2021-10-13 | Stop reason: SDUPTHER

## 2020-10-07 RX ORDER — ALLOPURINOL 300 MG/1
300 TABLET ORAL DAILY
Qty: 90 TAB | Refills: 3 | Status: SHIPPED | OUTPATIENT
Start: 2020-10-07 | End: 2021-10-13 | Stop reason: SDUPTHER

## 2020-10-07 ASSESSMENT — FIBROSIS 4 INDEX: FIB4 SCORE: 1.45

## 2020-10-07 NOTE — PROGRESS NOTES
Chief Complaint   Patient presents with   • Lab Results       HISTORY OF PRESENT ILLNESS: Patient is a 64 y.o. male established patient who presents today to discuss the medical issues below.    HTN (hypertension)  Patient reports some stress the last few days, regarding water irrigation.  Patient is not following at home.  He denies chest pain palpitation or edema.      Obesity  Patient admits to trouble follow diet and exercise.     H/O: gout  Patient continues on allopurinol has not had any gout attacks.  Weight is unchanged.    Mild vitamin D deficiency  On supplementation.       Patient Active Problem List    Diagnosis Date Noted   • Fasting hyperglycemia 03/23/2017     Priority: Medium   • Mild vitamin D deficiency 04/19/2012     Priority: Medium   • Obesity 04/19/2012     Priority: Medium   • HTN (hypertension) 04/11/2012     Priority: Medium   • Impacted cerumen of left ear 01/10/2019   • Primary insomnia 09/21/2016   • Peyronie disease 09/21/2016   • History of colonic polyps 02/03/2016   • Hyperkeratosis 10/01/2015   • Dyslipidemia 06/14/2012   • Ruptured lumbar disc 12/01/2011   • H/O: gout 12/01/2011   • Depression 12/01/2011       Allergies:Patient has no known allergies.    Current Outpatient Medications   Medication Sig Dispense Refill   • lisinopril (PRINIVIL) 40 MG tablet Take 1 Tab by mouth every day. 90 Tab 3   • allopurinol (ZYLOPRIM) 300 MG Tab Take 1 Tab by mouth every day. 90 Tab 3   • lovastatin (MEVACOR) 40 MG tablet Take 1 Tab by mouth every day. 90 Tab 3   • niacin 500 MG Tab Take 1 Tab by mouth every day. 90 Tab 3   • Vitamin D, Cholecalciferol, 1000 UNITS Tab Take  by mouth.       No current facility-administered medications for this visit.          Past Medical History:   Diagnosis Date   • Depression 12/1/2011   • Fasting hyperglycemia 3/23/2017   • GOUT 12/1/2011   • Hyperkeratosis 10/1/2015   • Peyronie disease 9/21/2016   • Primary insomnia 9/21/2016   • Ruptured lumbar disc  "2011       Social History     Tobacco Use   • Smoking status: Former Smoker     Years: 10.00     Types: Cigarettes     Quit date: 4/10/1985     Years since quittin.5   • Smokeless tobacco: Former User     Types: Chew     Quit date: 3/15/2014   • Tobacco comment: x 30 years - 7.12.12 thinking about it   Substance Use Topics   • Alcohol use: Yes     Alcohol/week: 0.0 oz     Comment: rare   • Drug use: No       Family Status   Relation Name Status   • Mo   at age 72        CHF   • Sis  Alive   • Fa   at age 86        MI   • Bro  Alive   • MGMo     • MGFa     • PGMo     • PGFa     • Marina  Alive     Family History   Problem Relation Age of Onset   • Heart Disease Mother    • Diabetes Mother    • Diabetes Sister    • Diabetes Brother        ROS:    Respiratory: Negative for cough, sputum production, shortness of breath or wheezing.    Cardiovascular: Negative for chest pain, palpitations, orthopnea, dyspnea with exertion or edema.   Gastrointestinal: Negative for GI upset, nausea, vomiting, abdominal pain, constipation or diarrhea.   Genitourinary: Negative for dysuria, urgency, hesitancy or frequency.       Exam:    /80   Pulse (!) 103   Temp 36.8 °C (98.2 °F) (Temporal)   Ht 1.778 m (5' 10\")   Wt 112.9 kg (249 lb)   SpO2 97%  Body mass index is 35.73 kg/m².  General:  Well nourished, well developed male in NAD.  Pulmonary: Clear to ausculation and percussion.  Normal effort. No rales, rhonchi, or wheezing.  Cardiovascular: Regular rate and rhythm without murmur.   Abdomen: Normal bowel sounds soft and nontender no palpable liver spleen bladder mass.  Extremities: No LE edema noted.  Neuro: Grossly nonfocal.  Psych: Alert and oriented to person, place, and time. Appropriate mood and conversation.  LABS: Results reviewed and discussed with the patient, questions answered.    This dictation was created using voice recognition software. I have made " reasonable attempts to correct errors, however, errors of grammar and content may exist.          Assessment/Plan:    1. Essential hypertension  Blood pressure is up a little bit today.  Identified stress.  Home monitoring discussed no change to meds for now refills written.  - Comp Metabolic Panel; Future  - CBC WITH DIFFERENTIAL; Future    2. Class 1 obesity due to excess calories without serious comorbidity with body mass index (BMI) of 30.0 to 30.9 in adult  Discussed diet, exercise, weight loss, behavioral modification, portion size management.      3. H/O: gout  No breakthrough.  Continuing on meds refills written lab assessment scheduled.  - allopurinol (ZYLOPRIM) 300 MG Tab; Take 1 Tab by mouth every day.  Dispense: 90 Tab; Refill: 3  - URIC ACID, SERUM    4. Dyslipidemia  Ongoing statin, discussed diet.  Monitoring.  - lovastatin (MEVACOR) 40 MG tablet; Take 1 Tab by mouth every day.  Dispense: 90 Tab; Refill: 3  - Lipid Profile; Future    5. Mild vitamin D deficiency  On replacement ongoing monitoring discussed.  - VITAMIN D,25 HYDROXY; Future    6.  Fasting hyperglycemia  Weight is unchanged.  He continues with morning hyperglycemia however A1c remaining at 5.6.  Implications of weight discussed ongoing monitoring currently on no medications.  Discussed diet.       Patient was seen for 25 minutes face to face of which more than 50% of the time was spent in counseling and coordination of care regarding the above problems.

## 2020-10-07 NOTE — ASSESSMENT & PLAN NOTE
Patient reports some stress the last few days, regarding water irrigation.  Patient is not following at home.  He denies chest pain palpitation or edema.

## 2021-04-07 ENCOUNTER — HOSPITAL ENCOUNTER (OUTPATIENT)
Dept: LAB | Facility: MEDICAL CENTER | Age: 66
End: 2021-04-07
Attending: INTERNAL MEDICINE
Payer: MEDICARE

## 2021-04-07 DIAGNOSIS — E78.5 DYSLIPIDEMIA: ICD-10-CM

## 2021-04-07 DIAGNOSIS — E55.9 MILD VITAMIN D DEFICIENCY: ICD-10-CM

## 2021-04-07 DIAGNOSIS — I10 ESSENTIAL HYPERTENSION: ICD-10-CM

## 2021-04-07 PROCEDURE — 80053 COMPREHEN METABOLIC PANEL: CPT

## 2021-04-07 PROCEDURE — 82306 VITAMIN D 25 HYDROXY: CPT

## 2021-04-07 PROCEDURE — 80061 LIPID PANEL: CPT

## 2021-04-07 PROCEDURE — 36415 COLL VENOUS BLD VENIPUNCTURE: CPT

## 2021-04-07 PROCEDURE — 85025 COMPLETE CBC W/AUTO DIFF WBC: CPT

## 2021-04-07 PROCEDURE — 84550 ASSAY OF BLOOD/URIC ACID: CPT

## 2021-04-08 LAB
ALBUMIN SERPL BCP-MCNC: 4.6 G/DL (ref 3.2–4.9)
ALBUMIN/GLOB SERPL: 1.6 G/DL
ALP SERPL-CCNC: 80 U/L (ref 30–99)
ALT SERPL-CCNC: 25 U/L (ref 2–50)
ANION GAP SERPL CALC-SCNC: 10 MMOL/L (ref 7–16)
AST SERPL-CCNC: 21 U/L (ref 12–45)
BASOPHILS # BLD AUTO: 0.6 % (ref 0–1.8)
BASOPHILS # BLD: 0.04 K/UL (ref 0–0.12)
BILIRUB SERPL-MCNC: 0.7 MG/DL (ref 0.1–1.5)
BUN SERPL-MCNC: 15 MG/DL (ref 8–22)
CALCIUM SERPL-MCNC: 9.5 MG/DL (ref 8.5–10.5)
CHLORIDE SERPL-SCNC: 102 MMOL/L (ref 96–112)
CHOLEST SERPL-MCNC: 135 MG/DL (ref 100–199)
CO2 SERPL-SCNC: 26 MMOL/L (ref 20–33)
CREAT SERPL-MCNC: 0.89 MG/DL (ref 0.5–1.4)
EOSINOPHIL # BLD AUTO: 0.08 K/UL (ref 0–0.51)
EOSINOPHIL NFR BLD: 1.2 % (ref 0–6.9)
ERYTHROCYTE [DISTWIDTH] IN BLOOD BY AUTOMATED COUNT: 45.5 FL (ref 35.9–50)
FASTING STATUS PATIENT QL REPORTED: NORMAL
GLOBULIN SER CALC-MCNC: 2.8 G/DL (ref 1.9–3.5)
GLUCOSE SERPL-MCNC: 107 MG/DL (ref 65–99)
HCT VFR BLD AUTO: 48.9 % (ref 42–52)
HDLC SERPL-MCNC: 28 MG/DL
HGB BLD-MCNC: 16.4 G/DL (ref 14–18)
IMM GRANULOCYTES # BLD AUTO: 0.04 K/UL (ref 0–0.11)
IMM GRANULOCYTES NFR BLD AUTO: 0.6 % (ref 0–0.9)
LDLC SERPL CALC-MCNC: 69 MG/DL
LYMPHOCYTES # BLD AUTO: 2.38 K/UL (ref 1–4.8)
LYMPHOCYTES NFR BLD: 34.4 % (ref 22–41)
MCH RBC QN AUTO: 33.1 PG (ref 27–33)
MCHC RBC AUTO-ENTMCNC: 33.5 G/DL (ref 33.7–35.3)
MCV RBC AUTO: 98.6 FL (ref 81.4–97.8)
MONOCYTES # BLD AUTO: 0.6 K/UL (ref 0–0.85)
MONOCYTES NFR BLD AUTO: 8.7 % (ref 0–13.4)
NEUTROPHILS # BLD AUTO: 3.77 K/UL (ref 1.82–7.42)
NEUTROPHILS NFR BLD: 54.5 % (ref 44–72)
NRBC # BLD AUTO: 0 K/UL
NRBC BLD-RTO: 0 /100 WBC
PLATELET # BLD AUTO: 199 K/UL (ref 164–446)
PMV BLD AUTO: 10.2 FL (ref 9–12.9)
POTASSIUM SERPL-SCNC: 4.2 MMOL/L (ref 3.6–5.5)
PROT SERPL-MCNC: 7.4 G/DL (ref 6–8.2)
RBC # BLD AUTO: 4.96 M/UL (ref 4.7–6.1)
SODIUM SERPL-SCNC: 138 MMOL/L (ref 135–145)
TRIGL SERPL-MCNC: 188 MG/DL (ref 0–149)
URATE SERPL-MCNC: 5.7 MG/DL (ref 2.5–8.3)
WBC # BLD AUTO: 6.9 K/UL (ref 4.8–10.8)

## 2021-04-09 LAB — 25(OH)D3 SERPL-MCNC: 35 NG/ML (ref 30–80)

## 2021-04-14 ENCOUNTER — OFFICE VISIT (OUTPATIENT)
Dept: MEDICAL GROUP | Facility: PHYSICIAN GROUP | Age: 66
End: 2021-04-14
Payer: MEDICARE

## 2021-04-14 VITALS
SYSTOLIC BLOOD PRESSURE: 110 MMHG | DIASTOLIC BLOOD PRESSURE: 66 MMHG | HEIGHT: 71 IN | HEART RATE: 104 BPM | RESPIRATION RATE: 16 BRPM | OXYGEN SATURATION: 96 % | BODY MASS INDEX: 33.74 KG/M2 | WEIGHT: 241 LBS | TEMPERATURE: 98.6 F

## 2021-04-14 DIAGNOSIS — M15.9 PRIMARY OSTEOARTHRITIS INVOLVING MULTIPLE JOINTS: ICD-10-CM

## 2021-04-14 DIAGNOSIS — I10 ESSENTIAL HYPERTENSION: ICD-10-CM

## 2021-04-14 DIAGNOSIS — M51.26 RUPTURED LUMBAR DISC: ICD-10-CM

## 2021-04-14 DIAGNOSIS — Z87.39 H/O: GOUT: ICD-10-CM

## 2021-04-14 DIAGNOSIS — R73.01 FASTING HYPERGLYCEMIA: ICD-10-CM

## 2021-04-14 DIAGNOSIS — E78.5 DYSLIPIDEMIA: ICD-10-CM

## 2021-04-14 PROCEDURE — 99214 OFFICE O/P EST MOD 30 MIN: CPT | Performed by: INTERNAL MEDICINE

## 2021-04-14 RX ORDER — LISINOPRIL 20 MG/1
40 TABLET ORAL
COMMUNITY
Start: 2021-04-01 | End: 2021-07-02 | Stop reason: SDUPTHER

## 2021-04-14 RX ORDER — CELECOXIB 100 MG/1
100 CAPSULE ORAL
Qty: 180 CAPSULE | Refills: 3 | Status: SHIPPED | OUTPATIENT
Start: 2021-04-14 | End: 2021-08-16

## 2021-04-14 ASSESSMENT — FIBROSIS 4 INDEX: FIB4 SCORE: 1.41

## 2021-04-14 NOTE — ASSESSMENT & PLAN NOTE
Patient reports after first covid he has developed more aching in all his joints, mostly UE and shoulders, wrist.  He feels his knees are ok.  His back is at baseline.

## 2021-04-14 NOTE — PROGRESS NOTES
Chief Complaint   Patient presents with   • Lab Results       HISTORY OF PRESENT ILLNESS: Patient is a 65 y.o. male established patient who presents today to discuss the medical issues below.    Fasting hyperglycemia  Patient has been working on diet, weight is down almost 10#.      HTN (hypertension)  Continues on lisinopril at 20 mg aday    Dyslipidemia  Continues on the niacin and lovastatin, weight is down.     H/O: gout  On chronic suppression of 300 mg a day allopurinol.  No outbreaks.     Primary osteoarthritis involving multiple joints  Patient reports after first covid he has developed more aching in all his joints, mostly UE and shoulders, wrist.  He feels his knees are ok.  His back is at baseline.    Ruptured lumbar disc  Patient reports low back waxing and waning at baseline. Not following with spine for several years.  Patient estimates at 4/10.  He notes low back muscle tightness with walking.  Not doing low back exercises.  Problem since 2004.        Patient Active Problem List    Diagnosis Date Noted   • Fasting hyperglycemia 03/23/2017   • Mild vitamin D deficiency 04/19/2012   • Obesity 04/19/2012   • HTN (hypertension) 04/11/2012   • Primary osteoarthritis involving multiple joints 04/14/2021   • Impacted cerumen of left ear 01/10/2019   • Primary insomnia 09/21/2016   • Peyronie disease 09/21/2016   • History of colonic polyps 02/03/2016   • Hyperkeratosis 10/01/2015   • Dyslipidemia 06/14/2012   • Ruptured lumbar disc 12/01/2011   • H/O: gout 12/01/2011   • Depression 12/01/2011       Allergies:Patient has no known allergies.    Current Outpatient Medications   Medication Sig Dispense Refill   • lisinopril (PRINIVIL) 20 MG Tab Take 40 mg by mouth. 2 tabs PO qd     • celecoxib (CELEBREX) 100 MG Cap Take 1 capsule by mouth 2 times daily with meals as needed. 180 capsule 3   • allopurinol (ZYLOPRIM) 300 MG Tab Take 1 Tab by mouth every day. 90 Tab 3   • lovastatin (MEVACOR) 40 MG tablet Take 1  "Tab by mouth every day. 90 Tab 3   • niacin 500 MG Tab Take 1 Tab by mouth every day. 90 Tab 3   • Vitamin D, Cholecalciferol, 1000 UNITS Tab Take  by mouth.       No current facility-administered medications for this visit.         Past Medical History:   Diagnosis Date   • Depression 2011   • Fasting hyperglycemia 3/23/2017   • GOUT 2011   • Hyperkeratosis 10/1/2015   • Peyronie disease 2016   • Primary insomnia 2016   • Ruptured lumbar disc 2011       Social History     Tobacco Use   • Smoking status: Former Smoker     Years: 10.00     Types: Cigarettes     Quit date: 4/10/1985     Years since quittin.0   • Smokeless tobacco: Former User     Types: Chew     Quit date: 3/15/2014   • Tobacco comment: x 30 years - 12 thinking about it   Substance Use Topics   • Alcohol use: Yes     Alcohol/week: 0.0 oz     Comment: rare   • Drug use: No       Family Status   Relation Name Status   • Mo   at age 72        CHF   • Sis  Alive   • Fa   at age 86        MI   • Bro  Alive   • MGMo     • MGFa     • PGMo     • PGFa     • Marina  Alive     Family History   Problem Relation Age of Onset   • Heart Disease Mother    • Diabetes Mother    • Diabetes Sister    • Diabetes Brother        ROS:    Respiratory: Negative for cough, sputum production, shortness of breath or wheezing.    Cardiovascular: Negative for chest pain, palpitations, orthopnea, dyspnea with exertion or edema.   Gastrointestinal: Negative for GI upset, nausea, vomiting, abdominal pain, constipation or diarrhea.   Genitourinary: Negative for dysuria, urgency, hesitancy or frequency.       Exam:    /66 (BP Location: Right arm, Patient Position: Sitting, BP Cuff Size: Large adult)   Pulse (!) 104   Temp 37 °C (98.6 °F) (Temporal)   Resp 16   Ht 1.803 m (5' 11\")   Wt 109 kg (241 lb)   SpO2 96%  Body mass index is 33.61 kg/m².  General:  Well nourished, well developed male in " NAD.  Pulmonary: Clear to ausculation and percussion.  Normal effort. No rales, rhonchi, or wheezing.  Cardiovascular: Regular rate and rhythm without murmur.   Abdomen: Normal bowel sounds soft and nontender no palpable liver spleen bladder mass.  Extremities: No LE edema noted.  Neuro: Grossly nonfocal.  Psych: Alert and oriented to person, place, and time. Appropriate mood and conversation.    LABS: Results reviewed and discussed with the patient, questions answered.    This dictation was created using voice recognition software. I have made reasonable attempts to correct errors, however, errors of grammar and content may exist.          Assessment/Plan:    1. Fasting hyperglycemia  Improved labs and weight, Discussed diet, exercise, weight loss, behavioral modification, portion size management.  Patient with excellent questions and insight in terms of insulin resistance.    - HEMOGLOBIN A1C; Future    2. Essential hypertension  Controlled continuing on meds.  - Comp Metabolic Panel; Future  - CBC WITH DIFFERENTIAL; Future    3. Dyslipidemia  Improved LDL persisting bit elevated.  Long discussion held on diet  - Lipid Profile; Future    4. H/O: gout  Stable on meds    5. Primary osteoarthritis involving multiple joints  Patient continues with lots of aches and pains.  No signs of inflammation.  More likely related to the increase in activity as he is back to the field in the spring as opposed to Covid vaccination.  Restart on the Celebrex discussed at length with patient.  Support.  Patient wants to try some homeopathic's support given.    6. Ruptured lumbar disc  Monitoring with ongoing Celebrex good back mechanics.  He still has not tried yoga.  - celecoxib (CELEBREX) 100 MG Cap; Take 1 capsule by mouth 2 times daily with meals as needed.  Dispense: 180 capsule; Refill: 3       Patient was seen for 25 minutes face to face of which more than 50% of the time was spent in counseling and coordination of care  regarding the above problems.

## 2021-04-14 NOTE — ASSESSMENT & PLAN NOTE
Patient reports low back waxing and waning at baseline. Not following with spine for several years.  Patient estimates at 4/10.  He notes low back muscle tightness with walking.  Not doing low back exercises.  Problem since 2004.

## 2021-07-02 ENCOUNTER — OFFICE VISIT (OUTPATIENT)
Dept: MEDICAL GROUP | Facility: PHYSICIAN GROUP | Age: 66
End: 2021-07-02
Payer: MEDICARE

## 2021-07-02 ENCOUNTER — HOSPITAL ENCOUNTER (OUTPATIENT)
Dept: LAB | Facility: MEDICAL CENTER | Age: 66
End: 2021-07-02
Attending: NURSE PRACTITIONER
Payer: MEDICARE

## 2021-07-02 VITALS
SYSTOLIC BLOOD PRESSURE: 120 MMHG | TEMPERATURE: 97.2 F | DIASTOLIC BLOOD PRESSURE: 72 MMHG | RESPIRATION RATE: 16 BRPM | BODY MASS INDEX: 34.05 KG/M2 | HEIGHT: 71 IN | HEART RATE: 107 BPM | OXYGEN SATURATION: 95 % | WEIGHT: 243.2 LBS

## 2021-07-02 DIAGNOSIS — M79.652 LEFT THIGH PAIN: ICD-10-CM

## 2021-07-02 DIAGNOSIS — M25.60 JOINT STIFFNESS: ICD-10-CM

## 2021-07-02 DIAGNOSIS — M15.9 PRIMARY OSTEOARTHRITIS INVOLVING MULTIPLE JOINTS: ICD-10-CM

## 2021-07-02 PROCEDURE — 36415 COLL VENOUS BLD VENIPUNCTURE: CPT

## 2021-07-02 PROCEDURE — 86200 CCP ANTIBODY: CPT

## 2021-07-02 PROCEDURE — 86431 RHEUMATOID FACTOR QUANT: CPT

## 2021-07-02 PROCEDURE — 99214 OFFICE O/P EST MOD 30 MIN: CPT | Performed by: NURSE PRACTITIONER

## 2021-07-02 RX ORDER — LISINOPRIL 20 MG/1
40 TABLET ORAL DAILY
Qty: 180 TABLET | Refills: 1 | Status: SHIPPED | OUTPATIENT
Start: 2021-07-02 | End: 2021-10-13 | Stop reason: SDUPTHER

## 2021-07-02 ASSESSMENT — FIBROSIS 4 INDEX: FIB4 SCORE: 1.41
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
## 2021-07-03 LAB — RHEUMATOID FACT SER IA-ACNC: <10 IU/ML (ref 0–14)

## 2021-07-04 LAB — CCP IGG SERPL-ACNC: 3 UNITS (ref 0–19)

## 2021-07-09 ENCOUNTER — TELEPHONE (OUTPATIENT)
Dept: URGENT CARE | Facility: PHYSICIAN GROUP | Age: 66
End: 2021-07-09

## 2021-07-09 NOTE — TELEPHONE ENCOUNTER
----- Message from VIDA Herrmann sent at 7/5/2021  7:49 AM PDT -----  Please call patient and let him know that their lab results were normal.  Keep follow up appointment with Dr. Marcus

## 2021-07-27 ENCOUNTER — OFFICE VISIT (OUTPATIENT)
Dept: MEDICAL GROUP | Facility: PHYSICIAN GROUP | Age: 66
End: 2021-07-27
Payer: MEDICARE

## 2021-07-27 VITALS
OXYGEN SATURATION: 96 % | WEIGHT: 244 LBS | BODY MASS INDEX: 34.93 KG/M2 | HEIGHT: 70 IN | SYSTOLIC BLOOD PRESSURE: 130 MMHG | TEMPERATURE: 97.9 F | HEART RATE: 78 BPM | DIASTOLIC BLOOD PRESSURE: 70 MMHG | RESPIRATION RATE: 14 BRPM

## 2021-07-27 DIAGNOSIS — R29.898 UPPER EXTREMITY WEAKNESS: ICD-10-CM

## 2021-07-27 DIAGNOSIS — M54.2 NECK PAIN: ICD-10-CM

## 2021-07-27 DIAGNOSIS — M15.9 PRIMARY OSTEOARTHRITIS INVOLVING MULTIPLE JOINTS: ICD-10-CM

## 2021-07-27 PROCEDURE — 99214 OFFICE O/P EST MOD 30 MIN: CPT | Performed by: NURSE PRACTITIONER

## 2021-07-27 RX ORDER — CYCLOBENZAPRINE HCL 5 MG
5-10 TABLET ORAL NIGHTLY PRN
Qty: 30 TABLET | Refills: 1 | Status: SHIPPED | OUTPATIENT
Start: 2021-07-27

## 2021-07-27 ASSESSMENT — FIBROSIS 4 INDEX: FIB4 SCORE: 1.41

## 2021-07-27 ASSESSMENT — PAIN SCALES - GENERAL: PAINLEVEL: 9=SEVERE PAIN

## 2021-07-27 NOTE — PROGRESS NOTES
CC: Multiple joint pain    HISTORY OF THE PRESENT ILLNESS: Patient is a 65 y.o. male. This pleasant patient is here today for evaluation and management of the following health problems.    His primary care provider is Dr. Marcus.      Primary osteoarthritis involving multiple joints  HPI from 7/2/21: Patient reports this is a chronic health problem that has worsened since second motor no Covid vaccine in 3/2021.  Reports hip joints, back, knees are all very stiff.  Hands are the worse.  Hands are swollen and very stiff.  Established earlier this week with a homeopath.  Has been given supplements and homeopathic drops to help with inflammation.  He reports he has noticed a mild improvement since starting regimen.  He was unable to fill recently prescribed Celebrex as it was not covered by his insurance.  He reports he had some old Celebrex left over and has taken that for a few days.  Has not noticed much of a difference.  Ibuprofen helps more.    Today's HPI:  Patient reports continued multiple joint pain and generalized weakness.  Areas of most pain are in bilateral shoulders, hands, low back, neck.  Pain recently became so severe that he was almost unable to get out of bed.  Continues with anti-inflammatory supplements from homeopathic provider.  Went to chiropractor yesterday.  Reports that he feels remarkably better since his adjustment by chiropractor.  He was able to get a good night sleep.  Pain is significantly improved.  He is wondering what to do if pain worsens again.  Not taking celebrex.  Taking ibuprofen.      Allergies: Cantaloupe and Cow's milk [lac bovis]    Current Outpatient Medications Ordered in Epic   Medication Sig Dispense Refill   • cyclobenzaprine (FLEXERIL) 5 mg tablet Take 1-2 Tablets by mouth at bedtime as needed. 30 tablet 1   • lisinopril (PRINIVIL) 20 MG Tab Take 2 Tablets by mouth every day. 180 tablet 1   • allopurinol (ZYLOPRIM) 300 MG Tab Take 1 Tab by mouth every day. 90 Tab 3  "  • lovastatin (MEVACOR) 40 MG tablet Take 1 Tab by mouth every day. 90 Tab 3   • niacin 500 MG Tab Take 1 Tab by mouth every day. 90 Tab 3   • Vitamin D, Cholecalciferol, 1000 UNITS Tab Take  by mouth.     • celecoxib (CELEBREX) 100 MG Cap Take 1 capsule by mouth 2 times daily with meals as needed. (Patient not taking: Reported on 2021) 180 capsule 3     No current [facility]-ordered facility-administered medications on file.       Past Medical History:   Diagnosis Date   • Depression 2011   • Fasting hyperglycemia 3/23/2017   • GOUT 2011   • Hyperkeratosis 10/1/2015   • Peyronie disease 2016   • Primary insomnia 2016   • Ruptured lumbar disc 2011       Past Surgical History:   Procedure Laterality Date   • CARPAL TUNNEL RELEASE         Social History     Tobacco Use   • Smoking status: Former Smoker     Years: 10.00     Types: Cigarettes     Quit date: 4/10/1985     Years since quittin.3   • Smokeless tobacco: Former User     Types: Chew     Quit date: 3/15/2014   • Tobacco comment: x 30 years - 12 thinking about it   Substance Use Topics   • Alcohol use: Yes     Alcohol/week: 0.0 oz     Comment: rare   • Drug use: No       Family History   Problem Relation Age of Onset   • Heart Disease Mother    • Diabetes Mother    • Diabetes Sister    • Diabetes Brother        ROS:   As in HPI, otherwise negative for chest pain, dyspnea, abdominal pain, dysuria, blood in stool, fever          Exam: /70 (BP Location: Right arm, Patient Position: Sitting)   Pulse 78   Temp 36.6 °C (97.9 °F)   Resp 14   Ht 1.778 m (5' 10\")   Wt 111 kg (244 lb)   SpO2 96%  Body mass index is 35.01 kg/m².    General: Alert, pleasant, well nourished, well developed male in NAD  Neck: Supple without bruit. Thyroid is not enlarged.  Pulmonary: Clear to ausculation.  Normal effort. No rales, ronchi, or wheezing.  Cardiovascular: Normal rate and rhythm without murmur.   No lower extremity edema.  Cervical " spine: No erythema or edema, nontender to palpation, limited range of motion secondary to pain, bilateral  strength 3/5 and equal bilaterally.  Limited shoulder lateral abduction to 90 degrees secondary to pain.  Neurologic: Grossly nonfocal  Lymph: No cervical or supraclavicular lymph nodes are palpable  Skin: Warm and dry.  .  Musculoskeletal: Mild antalgic gait. Hands without edema or erythema  Psych: Normal mood and affect. Alert and oriented. Judgment and insight is normal.    Component      Latest Ref Rng & Units 4/7/2021 7/2/2021   Uric Acid      2.5 - 8.3 mg/dL 5.7    Rheumatoid Factor -Neph-      0 - 14 IU/mL  <10   Ccp Antibodies      0 - 19 Units  3       Please note that this dictation was created using voice recognition software. I have made every reasonable attempt to correct obvious errors, but I expect that there are errors of grammar and possibly content that I did not discover before finalizing the note.      Assessment/Plan  1. Primary osteoarthritis involving multiple joints  Patient continues to have multiple joint pain.  Rheumatoid factor negative.  Uric acid level normal.  Other differentials include polymyalgia rheumatica, rhabdomyolysis.  We will get updated labs.  In the meantime we will manage with cyclobenzaprine.  Patient has taken this in the past.  Does report it has made him sleepy.  Will decrease dose to 5 mg tabs.  He understands he is not to drive while taking medication.  Offered referral to physiatry, patient declined.  Instructed patient to contact his homeopathic provider to inquire if it is okay that he take other anti-inflammatories, check if they are contraindicated with the anti-inflammatory drops he is taking.  If anti-inflammatories are not contraindicated, he will restart his Celebrex. Did discuss prescribing prednisone burst, but advised that he would need to discontinue anti-inflammatory homeopathic drops. Patient declines at this time.  - cyclobenzaprine  (FLEXERIL) 5 mg tablet; Take 1-2 Tablets by mouth at bedtime as needed.  Dispense: 30 tablet; Refill: 1  - CREATINE KINASE; Future  - CRP QUANTITIVE (NON-CARDIAC); Future  - Sed Rate; Future    2. Upper extremity weakness  Patient having significant upper extremity weakness with the neck and bilateral shoulder pain.  Reports some lower extremity weakness also.  We will get updated labs and cervical spine MRI.  - MR-CERVICAL SPINE-W/O; Future  - CREATINE KINASE; Future  - CRP QUANTITIVE (NON-CARDIAC); Future  - Sed Rate; Future    3. Neck pain  As in #2  - cyclobenzaprine (FLEXERIL) 5 mg tablet; Take 1-2 Tablets by mouth at bedtime as needed.  Dispense: 30 tablet; Refill: 1  - MR-CERVICAL SPINE-W/O; Future    Patient will return to clinic as previously scheduled with PCP on 8/16/2021 or sooner if needed.

## 2021-07-27 NOTE — ASSESSMENT & PLAN NOTE
HPI from 7/2/21: Patient reports this is a chronic health problem that has worsened since second motor no Covid vaccine in 3/2021.  Reports hip joints, back, knees are all very stiff.  Hands are the worse.  Hands are swollen and very stiff.  Established earlier this week with a homeopath.  Has been given supplements and homeopathic drops to help with inflammation.  He reports he has noticed a mild improvement since starting regimen.  He was unable to fill recently prescribed Celebrex as it was not covered by his insurance.  He reports he had some old Celebrex left over and has taken that for a few days.  Has not noticed much of a difference.  Ibuprofen helps more.    Today's HPI:  Patient reports continued multiple joint pain and generalized weakness.  Areas of most pain are in bilateral shoulders, hands, low back, neck.  Pain recently became so severe that he was almost unable to get out of bed.  Continues with anti-inflammatory supplements from homeopathic provider.  Went to chiropractor yesterday.  Reports that he feels remarkably better since his adjustment by chiropractor.  He was able to get a good night sleep.  Pain is significantly improved.  He is wondering what to do if pain worsens again.  Not taking celebrex.  Taking ibuprofen.

## 2021-07-29 ENCOUNTER — HOSPITAL ENCOUNTER (OUTPATIENT)
Dept: LAB | Facility: MEDICAL CENTER | Age: 66
End: 2021-07-29
Attending: NURSE PRACTITIONER
Payer: MEDICARE

## 2021-07-29 ENCOUNTER — HOSPITAL ENCOUNTER (OUTPATIENT)
Dept: RADIOLOGY | Facility: MEDICAL CENTER | Age: 66
End: 2021-07-29
Attending: NURSE PRACTITIONER
Payer: MEDICARE

## 2021-07-29 DIAGNOSIS — M15.9 PRIMARY OSTEOARTHRITIS INVOLVING MULTIPLE JOINTS: ICD-10-CM

## 2021-07-29 DIAGNOSIS — R29.898 UPPER EXTREMITY WEAKNESS: ICD-10-CM

## 2021-07-29 DIAGNOSIS — M25.60 JOINT STIFFNESS: ICD-10-CM

## 2021-07-29 DIAGNOSIS — M54.2 NECK PAIN: ICD-10-CM

## 2021-07-29 LAB
CK SERPL-CCNC: 84 U/L (ref 0–154)
CRP SERPL HS-MCNC: 0.33 MG/DL (ref 0–0.75)
ERYTHROCYTE [SEDIMENTATION RATE] IN BLOOD BY WESTERGREN METHOD: 20 MM/HOUR (ref 0–20)

## 2021-07-29 PROCEDURE — 86140 C-REACTIVE PROTEIN: CPT

## 2021-07-29 PROCEDURE — 72141 MRI NECK SPINE W/O DYE: CPT | Mod: ME

## 2021-07-29 PROCEDURE — 85652 RBC SED RATE AUTOMATED: CPT

## 2021-07-29 PROCEDURE — 77077 JOINT SURVEY SINGLE VIEW: CPT

## 2021-07-29 PROCEDURE — 82550 ASSAY OF CK (CPK): CPT

## 2021-07-29 PROCEDURE — 36415 COLL VENOUS BLD VENIPUNCTURE: CPT

## 2021-08-02 ENCOUNTER — TELEPHONE (OUTPATIENT)
Dept: MEDICAL GROUP | Facility: PHYSICIAN GROUP | Age: 66
End: 2021-08-02

## 2021-08-02 DIAGNOSIS — R29.898 UPPER EXTREMITY WEAKNESS: ICD-10-CM

## 2021-08-02 DIAGNOSIS — M54.2 NECK PAIN: ICD-10-CM

## 2021-08-02 DIAGNOSIS — R93.7 ABNORMAL MRI, CERVICAL SPINE: ICD-10-CM

## 2021-08-02 NOTE — TELEPHONE ENCOUNTER
I left voicemail for patient letting him know that hand x-rays and labs were normal.  Did let him know that cervical spine MRI showed moderate stenosis in multiple areas and that this may be contributing to his upper extremity weakness and bilateral neck pain, and that I am also referring him to spine surgeon for further evaluation.  He can discuss further with Dr. Marcus at his upcoming appointment on 8/16/2021.

## 2021-08-03 ENCOUNTER — TELEPHONE (OUTPATIENT)
Dept: MEDICAL GROUP | Facility: PHYSICIAN GROUP | Age: 66
End: 2021-08-03

## 2021-08-03 DIAGNOSIS — R93.7 ABNORMAL MRI, CERVICAL SPINE: ICD-10-CM

## 2021-08-03 DIAGNOSIS — M54.2 NECK PAIN: ICD-10-CM

## 2021-08-03 DIAGNOSIS — R29.898 UPPER EXTREMITY WEAKNESS: ICD-10-CM

## 2021-08-05 ENCOUNTER — TELEPHONE (OUTPATIENT)
Dept: MEDICAL GROUP | Facility: PHYSICIAN GROUP | Age: 66
End: 2021-08-05

## 2021-08-06 NOTE — TELEPHONE ENCOUNTER
I reached out to physiatry, Dr. Goodwin, regarding patient's cervical MRI results. Dr. Goodwin felt it was necessary to get patient in urgently. I had left patient a voicemail. Apparently, patient turned down available appointment with Dr. Soliman this Friday.  I attempted to call patient, but there was no answer.  He has a follow-up appointment with Dr. Marcus on 08/16/2021.

## 2021-08-16 ENCOUNTER — OFFICE VISIT (OUTPATIENT)
Dept: MEDICAL GROUP | Facility: PHYSICIAN GROUP | Age: 66
End: 2021-08-16
Payer: MEDICARE

## 2021-08-16 VITALS
BODY MASS INDEX: 33.79 KG/M2 | DIASTOLIC BLOOD PRESSURE: 70 MMHG | TEMPERATURE: 97.1 F | WEIGHT: 236 LBS | OXYGEN SATURATION: 95 % | HEIGHT: 70 IN | RESPIRATION RATE: 18 BRPM | SYSTOLIC BLOOD PRESSURE: 108 MMHG | HEART RATE: 102 BPM

## 2021-08-16 DIAGNOSIS — M15.9 PRIMARY OSTEOARTHRITIS INVOLVING MULTIPLE JOINTS: ICD-10-CM

## 2021-08-16 DIAGNOSIS — M50.30 DEGENERATIVE DISC DISEASE, CERVICAL: ICD-10-CM

## 2021-08-16 DIAGNOSIS — R53.83 FATIGUE, UNSPECIFIED TYPE: ICD-10-CM

## 2021-08-16 PROCEDURE — 99214 OFFICE O/P EST MOD 30 MIN: CPT | Performed by: INTERNAL MEDICINE

## 2021-08-16 RX ORDER — PREDNISONE 10 MG/1
TABLET ORAL
Qty: 20 TABLET | Refills: 0 | Status: SHIPPED | OUTPATIENT
Start: 2021-08-16 | End: 2021-10-13

## 2021-08-16 ASSESSMENT — PAIN SCALES - GENERAL: PAINLEVEL: 8=MODERATE-SEVERE PAIN

## 2021-08-16 ASSESSMENT — FIBROSIS 4 INDEX: FIB4 SCORE: 1.41

## 2021-08-16 NOTE — PATIENT INSTRUCTIONS
Stop Mevacor (lovastatin)  Prednisone 10 mg : 2 tabs po bid x 2 days, 3 tabs q am x 2 days, 2 tabs q D x2 days, one tab q D x 2 day (#20)

## 2021-08-16 NOTE — ASSESSMENT & PLAN NOTE
Reviewed MRI, multilevel disc disease and some retrolisthesis.  He complains of neck pain, shoulder aching, no UE weakness.

## 2021-08-16 NOTE — PROGRESS NOTES
"  Chief Complaint   Patient presents with   • Follow-Up     back pain       HISTORY OF PRESENT ILLNESS: Patient is a 65 y.o. male established patient who presents today to discuss the medical issues below.    Primary osteoarthritis involving multiple joints  Aching diffusly x several mos, states all joints, shoulders, knees, elbows, hips, only ankles spared.  Muscular aching along the left upper arm.  Has seen chiropractic but patient doesn't feel this is helping much. Celebrex ot helpful.  Flexaril at bedtime but doesn't seem to help.  Unable to get comfortable position to sleep.  Has been on the lovastatin over 10 years.  Patient has seen his homeopathic doctor who diagnosed milk and cantaloupe allergy and reaction to the Covid vaccination (by probe at electrical conduction measurement)  He is taking \"relief factor\" x about 2 mos with no improvement.      Degenerative disc disease, cervical  Reviewed MRI, multilevel disc disease and some retrolisthesis.  He complains of neck pain, shoulder aching, no UE weakness.        Patient Active Problem List    Diagnosis Date Noted   • Degenerative disc disease, cervical 08/16/2021   • Left thigh pain 07/02/2021   • Primary osteoarthritis involving multiple joints 04/14/2021   • Impacted cerumen of left ear 01/10/2019   • Fasting hyperglycemia 03/23/2017   • Primary insomnia 09/21/2016   • Peyronie disease 09/21/2016   • History of colonic polyps 02/03/2016   • Hyperkeratosis 10/01/2015   • Dyslipidemia 06/14/2012   • Mild vitamin D deficiency 04/19/2012   • Obesity 04/19/2012   • HTN (hypertension) 04/11/2012   • Ruptured lumbar disc 12/01/2011   • H/O: gout 12/01/2011   • Depression 12/01/2011       Allergies:Cantaloupe and Cow's milk [lac bovis]    Current Outpatient Medications   Medication Sig Dispense Refill   • predniSONE (DELTASONE) 10 MG Tab 2 tabs po bid x 2 days, 3 tabs q am x 2 days, 2 tabs q D x2 days, one tab q D x 2 day (#20) 20 Tablet 0   • cyclobenzaprine " (FLEXERIL) 5 mg tablet Take 1-2 Tablets by mouth at bedtime as needed. 30 tablet 1   • lisinopril (PRINIVIL) 20 MG Tab Take 2 Tablets by mouth every day. 180 tablet 1   • allopurinol (ZYLOPRIM) 300 MG Tab Take 1 Tab by mouth every day. 90 Tab 3   • lovastatin (MEVACOR) 40 MG tablet Take 1 Tab by mouth every day. 90 Tab 3   • niacin 500 MG Tab Take 1 Tab by mouth every day. 90 Tab 3   • Vitamin D, Cholecalciferol, 1000 UNITS Tab Take  by mouth.       No current facility-administered medications for this visit.         Past Medical History:   Diagnosis Date   • Depression 2011   • Fasting hyperglycemia 3/23/2017   • GOUT 2011   • Hyperkeratosis 10/1/2015   • Peyronie disease 2016   • Primary insomnia 2016   • Ruptured lumbar disc 2011       Social History     Tobacco Use   • Smoking status: Former Smoker     Years: 10.00     Types: Cigarettes     Quit date: 4/10/1985     Years since quittin.3   • Smokeless tobacco: Former User     Types: Chew     Quit date: 3/15/2014   • Tobacco comment: x 30 years - 7.12.12 thinking about it   Vaping Use   • Vaping Use: Never used   Substance Use Topics   • Alcohol use: Yes     Alcohol/week: 0.0 oz     Comment: rare   • Drug use: No       Family Status   Relation Name Status   • Mo   at age 72        CHF   • Sis  Alive   • Fa   at age 86        MI   • Bro  Alive   • MGMo     • MGFa     • PGMo     • PGFa     • Marina  Alive     Family History   Problem Relation Age of Onset   • Heart Disease Mother    • Diabetes Mother    • Diabetes Sister    • Diabetes Brother        ROS:    Respiratory: Negative for cough, sputum production, shortness of breath or wheezing.    Cardiovascular: Negative for chest pain, palpitations, orthopnea, dyspnea with exertion or edema.   Gastrointestinal: Negative for GI upset, nausea, vomiting, abdominal pain, constipation or diarrhea.   Genitourinary: Negative for dysuria, urgency,  "hesitancy or frequency.       Exam:    /70   Pulse (!) 102   Temp 36.2 °C (97.1 °F) (Temporal)   Resp 18   Ht 1.778 m (5' 10\")   Wt 107 kg (236 lb)   SpO2 95%  Body mass index is 33.86 kg/m².  General:  Well nourished, well developed male in NAD.  Spine was diffusely tender paravertebral muscles.  Diffuse tenderness along the joints of the shoulder.    Pulmonary: Clear to ausculation and percussion.  Normal effort. No rales, rhonchi, or wheezing.  Cardiovascular: Regular rate and rhythm without murmur.   Abdomen: Normal bowel sounds soft and nontender no palpable liver spleen bladder mass.  Extremities: No LE edema noted.  Neuro: Grossly nonfocal.  Psych: Alert and oriented to person, place, and time. Appropriate mood and conversation.    LABS: Results reviewed and discussed with the patient, questions answered.      This dictation was created using voice recognition software. I have made reasonable attempts to correct errors, however, errors of grammar and content may exist.          Assessment/Plan:    1. Primary osteoarthritis involving multiple joints  Tenderness but no signs of acute inflammation no effusions.  No erythema or warmth.  The muscles are tender themselves as opposed to the joints.  But no localization.  Reviewed labs negative for any elevation of the inflammatory markers.  The etiology of his significant diffuse myositis and arthritic and spine pain is on known.  He has been trying his homeopathic doctors regime and that is not seeming to help at all.  Inflammation nonspecific remains on the differential.  Discussed our options.  Monitor with an 8-day prednisone trial and see if this is helpful at all.  Referral back to his pain specialist.  Check labs including ESR thyroid follow-up.  Borderline on a chronic's pain syndrome of unknown etiology.  For now monitoring with the prednisone.  Consideration for gabapentin and/or serotonin reuptake inhibitors.  Referral back to pain management " as discussed.  Unlikely that the lovastatin is contributing due to the long duration of having been taking it however will discontinue that monitor clinically consider restart at follow-up.    2. Degenerative disc disease, cervical  MRI is reviewed he has diffuse disease but nothing that really explains his diffuse aches and pains.  Discussed as above #1    3. Fatigue, unspecified type  Fatigue is moderate component of the aching inflammatory markers negative.  He wonders if he has West Nile.  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  Also assess thyroid.  Discussed my chart for early support  - WE[place] NILE VIRUS ANTIBODY; Future  - TSH WITH REFLEX TO FT4; Future    Early follow-up  Patient was seen for 25 minutes face to face of which more than 50% of the time was spent in counseling and coordination of care regarding the above problems.

## 2021-08-16 NOTE — ASSESSMENT & PLAN NOTE
"Aching diffusly x several mos, states all joints, shoulders, knees, elbows, hips, only ankles spared.  Muscular aching along the left upper arm.  Has seen chiropractic but patient doesn't feel this is helping much. Celebrex ot helpful.  Flexaril at bedtime but doesn't seem to help.  Unable to get comfortable position to sleep.  Has been on the lovastatin over 10 years.  Patient has seen his homeopathic doctor who diagnosed milk and cantaloupe allergy and reaction to the Covid vaccination (by probe at electrical conduction measurement)  He is taking \"relief factor\" x about 2 mos with no improvement.    "

## 2021-10-04 ENCOUNTER — HOSPITAL ENCOUNTER (OUTPATIENT)
Dept: LAB | Facility: MEDICAL CENTER | Age: 66
End: 2021-10-04
Attending: INTERNAL MEDICINE
Payer: MEDICARE

## 2021-10-04 DIAGNOSIS — R73.01 FASTING HYPERGLYCEMIA: ICD-10-CM

## 2021-10-04 DIAGNOSIS — R53.83 FATIGUE, UNSPECIFIED TYPE: ICD-10-CM

## 2021-10-04 DIAGNOSIS — E78.5 DYSLIPIDEMIA: ICD-10-CM

## 2021-10-04 DIAGNOSIS — I10 ESSENTIAL HYPERTENSION: ICD-10-CM

## 2021-10-04 LAB
ALBUMIN SERPL BCP-MCNC: 4.2 G/DL (ref 3.2–4.9)
ALBUMIN/GLOB SERPL: 1.6 G/DL
ALP SERPL-CCNC: 75 U/L (ref 30–99)
ALT SERPL-CCNC: 21 U/L (ref 2–50)
ANION GAP SERPL CALC-SCNC: 11 MMOL/L (ref 7–16)
AST SERPL-CCNC: 20 U/L (ref 12–45)
BASOPHILS # BLD AUTO: 1.1 % (ref 0–1.8)
BASOPHILS # BLD: 0.06 K/UL (ref 0–0.12)
BILIRUB SERPL-MCNC: 0.5 MG/DL (ref 0.1–1.5)
BUN SERPL-MCNC: 17 MG/DL (ref 8–22)
CALCIUM SERPL-MCNC: 9.6 MG/DL (ref 8.5–10.5)
CHLORIDE SERPL-SCNC: 105 MMOL/L (ref 96–112)
CHOLEST SERPL-MCNC: 204 MG/DL (ref 100–199)
CO2 SERPL-SCNC: 22 MMOL/L (ref 20–33)
CREAT SERPL-MCNC: 0.75 MG/DL (ref 0.5–1.4)
EOSINOPHIL # BLD AUTO: 0.11 K/UL (ref 0–0.51)
EOSINOPHIL NFR BLD: 2 % (ref 0–6.9)
ERYTHROCYTE [DISTWIDTH] IN BLOOD BY AUTOMATED COUNT: 47.7 FL (ref 35.9–50)
EST. AVERAGE GLUCOSE BLD GHB EST-MCNC: 108 MG/DL
FASTING STATUS PATIENT QL REPORTED: NORMAL
GLOBULIN SER CALC-MCNC: 2.7 G/DL (ref 1.9–3.5)
GLUCOSE SERPL-MCNC: 111 MG/DL (ref 65–99)
HBA1C MFR BLD: 5.4 % (ref 4–5.6)
HCT VFR BLD AUTO: 44.7 % (ref 42–52)
HDLC SERPL-MCNC: 30 MG/DL
HGB BLD-MCNC: 15.6 G/DL (ref 14–18)
IMM GRANULOCYTES # BLD AUTO: 0.02 K/UL (ref 0–0.11)
IMM GRANULOCYTES NFR BLD AUTO: 0.4 % (ref 0–0.9)
LDLC SERPL CALC-MCNC: 135 MG/DL
LYMPHOCYTES # BLD AUTO: 2.18 K/UL (ref 1–4.8)
LYMPHOCYTES NFR BLD: 38.7 % (ref 22–41)
MCH RBC QN AUTO: 33.5 PG (ref 27–33)
MCHC RBC AUTO-ENTMCNC: 34.9 G/DL (ref 33.7–35.3)
MCV RBC AUTO: 95.9 FL (ref 81.4–97.8)
MONOCYTES # BLD AUTO: 0.35 K/UL (ref 0–0.85)
MONOCYTES NFR BLD AUTO: 6.2 % (ref 0–13.4)
NEUTROPHILS # BLD AUTO: 2.92 K/UL (ref 1.82–7.42)
NEUTROPHILS NFR BLD: 51.6 % (ref 44–72)
NRBC # BLD AUTO: 0 K/UL
NRBC BLD-RTO: 0 /100 WBC
PLATELET # BLD AUTO: 189 K/UL (ref 164–446)
PMV BLD AUTO: 10.2 FL (ref 9–12.9)
POTASSIUM SERPL-SCNC: 3.9 MMOL/L (ref 3.6–5.5)
PROT SERPL-MCNC: 6.9 G/DL (ref 6–8.2)
RBC # BLD AUTO: 4.66 M/UL (ref 4.7–6.1)
SODIUM SERPL-SCNC: 138 MMOL/L (ref 135–145)
TRIGL SERPL-MCNC: 193 MG/DL (ref 0–149)
TSH SERPL DL<=0.005 MIU/L-ACNC: 1.55 UIU/ML (ref 0.38–5.33)
WBC # BLD AUTO: 5.6 K/UL (ref 4.8–10.8)

## 2021-10-04 PROCEDURE — 36415 COLL VENOUS BLD VENIPUNCTURE: CPT

## 2021-10-04 PROCEDURE — 86790 VIRUS ANTIBODY NOS: CPT

## 2021-10-04 PROCEDURE — 80053 COMPREHEN METABOLIC PANEL: CPT

## 2021-10-04 PROCEDURE — 85025 COMPLETE CBC W/AUTO DIFF WBC: CPT

## 2021-10-04 PROCEDURE — 83036 HEMOGLOBIN GLYCOSYLATED A1C: CPT | Mod: GA

## 2021-10-04 PROCEDURE — 80061 LIPID PANEL: CPT

## 2021-10-04 PROCEDURE — 84443 ASSAY THYROID STIM HORMONE: CPT

## 2021-10-12 LAB
WNV IGG SER QL IA: ABNORMAL
WNV IGM SER QL IA: ABNORMAL

## 2021-10-13 ENCOUNTER — OFFICE VISIT (OUTPATIENT)
Dept: MEDICAL GROUP | Facility: PHYSICIAN GROUP | Age: 66
End: 2021-10-13
Payer: MEDICARE

## 2021-10-13 VITALS
BODY MASS INDEX: 34.53 KG/M2 | HEIGHT: 70 IN | HEART RATE: 98 BPM | WEIGHT: 241.2 LBS | SYSTOLIC BLOOD PRESSURE: 120 MMHG | DIASTOLIC BLOOD PRESSURE: 62 MMHG | TEMPERATURE: 98.1 F | OXYGEN SATURATION: 96 % | RESPIRATION RATE: 16 BRPM

## 2021-10-13 DIAGNOSIS — M15.9 PRIMARY OSTEOARTHRITIS INVOLVING MULTIPLE JOINTS: ICD-10-CM

## 2021-10-13 DIAGNOSIS — E78.5 DYSLIPIDEMIA: ICD-10-CM

## 2021-10-13 DIAGNOSIS — M50.30 DEGENERATIVE DISC DISEASE, CERVICAL: ICD-10-CM

## 2021-10-13 DIAGNOSIS — Z87.39 H/O: GOUT: ICD-10-CM

## 2021-10-13 DIAGNOSIS — M51.26 RUPTURED LUMBAR DISC: ICD-10-CM

## 2021-10-13 PROCEDURE — 99214 OFFICE O/P EST MOD 30 MIN: CPT | Performed by: INTERNAL MEDICINE

## 2021-10-13 RX ORDER — LOVASTATIN 40 MG/1
40 TABLET ORAL DAILY
Qty: 90 TABLET | Refills: 3 | Status: SHIPPED | OUTPATIENT
Start: 2021-10-13

## 2021-10-13 RX ORDER — ALLOPURINOL 300 MG/1
300 TABLET ORAL DAILY
Qty: 90 TABLET | Refills: 3 | Status: SHIPPED | OUTPATIENT
Start: 2021-10-13

## 2021-10-13 RX ORDER — LISINOPRIL 20 MG/1
40 TABLET ORAL DAILY
Qty: 180 TABLET | Refills: 3 | Status: SHIPPED | OUTPATIENT
Start: 2021-10-13

## 2021-10-13 RX ORDER — DICLOFENAC SODIUM 75 MG/1
75 TABLET, DELAYED RELEASE ORAL 2 TIMES DAILY
Qty: 60 TABLET | Refills: 3 | Status: SHIPPED | OUTPATIENT
Start: 2021-10-13

## 2021-10-13 ASSESSMENT — FIBROSIS 4 INDEX: FIB4 SCORE: 1.55

## 2021-10-13 ASSESSMENT — PATIENT HEALTH QUESTIONNAIRE - PHQ9: CLINICAL INTERPRETATION OF PHQ2 SCORE: 0

## 2021-10-13 NOTE — ASSESSMENT & PLAN NOTE
Patient wants to discuss the shoulder pain.  This is bilateral but worse on the left, heat helps, prednisone didn't help.  Stopping the lovastatin didn't help.  Pain at the superior aspect of the shoulder, radiates into the upper arm on the left more than the right but is present on the right.

## 2021-10-13 NOTE — PROGRESS NOTES
Chief Complaint   Patient presents with   • Follow-Up       HISTORY OF PRESENT ILLNESS: Patient is a 65 y.o. male established patient who presents today to discuss the medical issues below.    Dyslipidemia  Patient off the lovastatin for trial.  No notable improvement.     Primary osteoarthritis involving multiple joints  Patient wants to discuss the shoulder pain.  This is bilateral but worse on the left, heat helps, prednisone didn't help.  Stopping the lovastatin didn't help.  Pain at the superior aspect of the shoulder, radiates into the upper arm on the left more than the right but is present on the right.      Ruptured lumbar disc  Patient reports his low back is bad enough he cannot sit upright in a vehicle.  He has not been able to get an appointment in [place], transportation issues.      Degenerative disc disease, cervical  Has not seen spine.  Takes occasional Aleve.  Steroids not helpful.  Not using the flexaril, felt a hang over.        Patient Active Problem List    Diagnosis Date Noted   • Degenerative disc disease, cervical 08/16/2021   • Left thigh pain 07/02/2021   • Primary osteoarthritis involving multiple joints 04/14/2021   • Impacted cerumen of left ear 01/10/2019   • Fasting hyperglycemia 03/23/2017   • Primary insomnia 09/21/2016   • Peyronie disease 09/21/2016   • History of colonic polyps 02/03/2016   • Hyperkeratosis 10/01/2015   • Dyslipidemia 06/14/2012   • Mild vitamin D deficiency 04/19/2012   • Obesity 04/19/2012   • HTN (hypertension) 04/11/2012   • Ruptured lumbar disc 12/01/2011   • H/O: gout 12/01/2011   • Depression 12/01/2011       Allergies:Cantaloupe and Cow's milk [lac bovis]    Current Outpatient Medications   Medication Sig Dispense Refill   • NON SPECIFIED Relief Factor     • diclofenac DR (VOLTAREN) 75 MG Tablet Delayed Response Take 1 Tablet by mouth 2 times a day. 60 Tablet 3   • lovastatin (MEVACOR) 40 MG tablet Take 1 Tablet by mouth every day. 90 Tablet 3   •  allopurinol (ZYLOPRIM) 300 MG Tab Take 1 Tablet by mouth every day. 90 Tablet 3   • lisinopril (PRINIVIL) 20 MG Tab Take 2 Tablets by mouth every day. 180 Tablet 3   • cyclobenzaprine (FLEXERIL) 5 mg tablet Take 1-2 Tablets by mouth at bedtime as needed. 30 tablet 1   • niacin 500 MG Tab Take 1 Tab by mouth every day. 90 Tab 3   • Vitamin D, Cholecalciferol, 1000 UNITS Tab Take  by mouth.       No current facility-administered medications for this visit.         Past Medical History:   Diagnosis Date   • Depression 2011   • Fasting hyperglycemia 3/23/2017   • GOUT 2011   • Hyperkeratosis 10/1/2015   • Peyronie disease 2016   • Primary insomnia 2016   • Ruptured lumbar disc 2011       Social History     Tobacco Use   • Smoking status: Former Smoker     Years: 10.00     Types: Cigarettes     Quit date: 4/10/1985     Years since quittin.5   • Smokeless tobacco: Former User     Types: Chew     Quit date: 3/15/2014   • Tobacco comment: x 30 years - 7.12.12 thinking about it   Vaping Use   • Vaping Use: Never used   Substance Use Topics   • Alcohol use: Yes     Alcohol/week: 0.0 oz     Comment: rare   • Drug use: No       Family Status   Relation Name Status   • Mo   at age 72        CHF   • Sis  Alive   • Fa   at age 86        MI   • Bro  Alive   • MGMo     • MGFa     • PGMo     • PGFa     • Marina  Alive     Family History   Problem Relation Age of Onset   • Heart Disease Mother    • Diabetes Mother    • Diabetes Sister    • Diabetes Brother        ROS:    Respiratory: Negative for cough, sputum production, shortness of breath or wheezing.    Cardiovascular: Negative for chest pain, palpitations, orthopnea, dyspnea with exertion or edema.   Gastrointestinal: Negative for GI upset, nausea, vomiting, abdominal pain, constipation or diarrhea.   Genitourinary: Negative for dysuria, urgency, hesitancy or frequency.       Exam:    /62   Pulse 98  "  Temp 36.7 °C (98.1 °F) (Temporal)   Resp 16   Ht 1.778 m (5' 10\")   Wt 109 kg (241 lb 3.2 oz)   SpO2 96%  Body mass index is 34.61 kg/m².  General:  Well nourished, well developed male in NAD.  HENT: Normocephalic, bilateral TMs are intact, nasal and oral mucosa with no lesions,   Neck: Supple without bruit. Thyroid is not enlarged.  Pulmonary: Clear to ausculation and percussion.  Normal effort. No rales, rhonchi, or wheezing.  Cardiovascular: Regular rate and rhythm without murmur.   Abdomen: Normal bowel sounds soft and nontender no palpable liver spleen bladder mass.  Extremities: No LE edema noted.  Neuro: Grossly nonfocal.  Psych: Alert and oriented to person, place, and time. Appropriate mood and conversation.    LABS: Results reviewed and discussed with the patient, questions answered.    This dictation was created using voice recognition software. I have made reasonable attempts to correct errors, however, errors of grammar and content may exist.          Assessment/Plan:    1. Dyslipidemia  Continuing on Mevacor prescription written. Well-controlled  - lovastatin (MEVACOR) 40 MG tablet; Take 1 Tablet by mouth every day.  Dispense: 90 Tablet; Refill: 3  - Lipid Profile; Future    2. Primary osteoarthritis involving multiple joints  Patient with lots of diffuse osteoarthritis. Discussed trial anti-inflammatory changed to diclofenac. Prescription written.  - Sed Rate; Future    3. Ruptured lumbar disc  Chronic with exacerbation. Discussed at length. Referral back to pain management at [facility].    4. Degenerative disc disease, cervical  Following with [facility]  - Comp Metabolic Panel; Future  - CBC WITH DIFFERENTIAL; Future    5. Dyslipidemia  On meds as above  - lovastatin (MEVACOR) 40 MG tablet; Take 1 Tablet by mouth every day.  Dispense: 90 Tablet; Refill: 3  - Lipid Profile; Future    6. H/O: gout  No gout breakthrough allopurinol refill written  - allopurinol (ZYLOPRIM) 300 MG Tab; Take 1 " Tablet by mouth every day.  Dispense: 90 Tablet; Refill: 3         Patient was seen for 25 minutes face to face of which more than 50% of the time was spent in counseling and coordination of care regarding the above problems.

## 2021-10-13 NOTE — ASSESSMENT & PLAN NOTE
Has not seen spine.  Takes occasional Aleve.  Steroids not helpful.  Not using the flexaril, felt a hang over.

## 2021-10-13 NOTE — ASSESSMENT & PLAN NOTE
Patient reports his low back is bad enough he cannot sit upright in a vehicle.  He has not been able to get an appointment in [place], transportation issues.

## 2022-04-07 ENCOUNTER — HOSPITAL ENCOUNTER (OUTPATIENT)
Dept: LAB | Facility: MEDICAL CENTER | Age: 67
End: 2022-04-07
Attending: INTERNAL MEDICINE
Payer: MEDICARE

## 2022-04-07 DIAGNOSIS — M50.30 DEGENERATIVE DISC DISEASE, CERVICAL: ICD-10-CM

## 2022-04-07 DIAGNOSIS — M15.9 PRIMARY OSTEOARTHRITIS INVOLVING MULTIPLE JOINTS: ICD-10-CM

## 2022-04-07 DIAGNOSIS — E78.5 DYSLIPIDEMIA: ICD-10-CM

## 2022-04-07 LAB
ALBUMIN SERPL BCP-MCNC: 5 G/DL (ref 3.2–4.9)
ALBUMIN/GLOB SERPL: 2.3 G/DL
ALP SERPL-CCNC: 84 U/L (ref 30–99)
ALT SERPL-CCNC: 29 U/L (ref 2–50)
ANION GAP SERPL CALC-SCNC: 13 MMOL/L (ref 7–16)
AST SERPL-CCNC: 22 U/L (ref 12–45)
BASOPHILS # BLD AUTO: 0.7 % (ref 0–1.8)
BASOPHILS # BLD: 0.05 K/UL (ref 0–0.12)
BILIRUB SERPL-MCNC: 0.6 MG/DL (ref 0.1–1.5)
BUN SERPL-MCNC: 21 MG/DL (ref 8–22)
CALCIUM SERPL-MCNC: 9.6 MG/DL (ref 8.5–10.5)
CHLORIDE SERPL-SCNC: 110 MMOL/L (ref 96–112)
CHOLEST SERPL-MCNC: 159 MG/DL (ref 100–199)
CO2 SERPL-SCNC: 22 MMOL/L (ref 20–33)
CREAT SERPL-MCNC: 1.05 MG/DL (ref 0.5–1.4)
EOSINOPHIL # BLD AUTO: 0.06 K/UL (ref 0–0.51)
EOSINOPHIL NFR BLD: 0.8 % (ref 0–6.9)
ERYTHROCYTE [DISTWIDTH] IN BLOOD BY AUTOMATED COUNT: 46.2 FL (ref 35.9–50)
ERYTHROCYTE [SEDIMENTATION RATE] IN BLOOD BY WESTERGREN METHOD: 9 MM/HOUR (ref 0–20)
FASTING STATUS PATIENT QL REPORTED: NORMAL
GFR SERPLBLD CREATININE-BSD FMLA CKD-EPI: 77 ML/MIN/1.73 M 2
GLOBULIN SER CALC-MCNC: 2.2 G/DL (ref 1.9–3.5)
GLUCOSE SERPL-MCNC: 106 MG/DL (ref 65–99)
HCT VFR BLD AUTO: 48.2 % (ref 42–52)
HDLC SERPL-MCNC: 32 MG/DL
HGB BLD-MCNC: 16.6 G/DL (ref 14–18)
IMM GRANULOCYTES # BLD AUTO: 0.02 K/UL (ref 0–0.11)
IMM GRANULOCYTES NFR BLD AUTO: 0.3 % (ref 0–0.9)
LDLC SERPL CALC-MCNC: 90 MG/DL
LYMPHOCYTES # BLD AUTO: 3.16 K/UL (ref 1–4.8)
LYMPHOCYTES NFR BLD: 43.7 % (ref 22–41)
MCH RBC QN AUTO: 33.4 PG (ref 27–33)
MCHC RBC AUTO-ENTMCNC: 34.4 G/DL (ref 33.7–35.3)
MCV RBC AUTO: 97 FL (ref 81.4–97.8)
MONOCYTES # BLD AUTO: 0.44 K/UL (ref 0–0.85)
MONOCYTES NFR BLD AUTO: 6.1 % (ref 0–13.4)
NEUTROPHILS # BLD AUTO: 3.5 K/UL (ref 1.82–7.42)
NEUTROPHILS NFR BLD: 48.4 % (ref 44–72)
NRBC # BLD AUTO: 0 K/UL
NRBC BLD-RTO: 0 /100 WBC
PLATELET # BLD AUTO: 192 K/UL (ref 164–446)
PMV BLD AUTO: 10.1 FL (ref 9–12.9)
POTASSIUM SERPL-SCNC: 4.4 MMOL/L (ref 3.6–5.5)
PROT SERPL-MCNC: 7.2 G/DL (ref 6–8.2)
RBC # BLD AUTO: 4.97 M/UL (ref 4.7–6.1)
SODIUM SERPL-SCNC: 145 MMOL/L (ref 135–145)
TRIGL SERPL-MCNC: 185 MG/DL (ref 0–149)
WBC # BLD AUTO: 7.2 K/UL (ref 4.8–10.8)

## 2022-04-07 PROCEDURE — 80053 COMPREHEN METABOLIC PANEL: CPT

## 2022-04-07 PROCEDURE — 80061 LIPID PANEL: CPT

## 2022-04-07 PROCEDURE — 85025 COMPLETE CBC W/AUTO DIFF WBC: CPT

## 2022-04-07 PROCEDURE — 85652 RBC SED RATE AUTOMATED: CPT

## 2022-04-07 PROCEDURE — 36415 COLL VENOUS BLD VENIPUNCTURE: CPT

## 2023-10-26 NOTE — ASSESSMENT & PLAN NOTE
Patient rarely using the cyclobenzaprine. Currently taking some over-the-counter Motrin. Not utilizing any of the Voltaren. No specific complaints of pain offered today, no lower extreme numbness tingling weakness. No recent injections.   [employer] Pregnancy And Lactation Text: This medication is Pregnancy Category B and is considered safe. There is some data to suggest avoiding during the third trimester. It is unknown if this medication is excreted in breast milk.